# Patient Record
Sex: FEMALE | Race: WHITE | NOT HISPANIC OR LATINO | Employment: OTHER | URBAN - METROPOLITAN AREA
[De-identification: names, ages, dates, MRNs, and addresses within clinical notes are randomized per-mention and may not be internally consistent; named-entity substitution may affect disease eponyms.]

---

## 2023-02-10 ENCOUNTER — TELEPHONE (OUTPATIENT)
Dept: TRANSPLANT | Facility: CLINIC | Age: 68
End: 2023-02-10
Payer: COMMERCIAL

## 2023-02-10 ENCOUNTER — DOCUMENTATION ONLY (OUTPATIENT)
Dept: TRANSPLANT | Facility: CLINIC | Age: 68
End: 2023-02-10
Payer: COMMERCIAL

## 2023-02-10 NOTE — TELEPHONE ENCOUNTER
"Donor Intake Start:23Donor Intake Complete:23  Expiration Date:23  Gender:FemalePreferred Language:English  Full Name:Kim Barone  Needed:[not answered]  Phone Number:[phone] Phone:  Contact Preference:[not answered]Best Contact Time:9am - 5pm  Emergency Contact:Harish Escobar Contact #:[phone]  Relationship to Contact:Contact is my spouse  :[birth year]Age:67  Country:[place]  Address:[address] [place] [address] [place] [place]  [id number]  Height:5'7\"Weight:145lbs  BMI:22.7  Employment Status:RetiredHas PTO for donation?[not answered]  Occupation:[not answered]Requires Heavy Lifting?[not answered]  Education Level:High SchoolMarital Status:  Exercise Routine:OccasionalHealth Insurance:  Yes  Blood Type:AEthnicity/Race:White  Donor Type:Standard Voucher Donor  Prefer Remote Donation:[not answered]  Physician:Deja Flowers [place]  Motivation to donate:  I'm a friend of Maria Del Carmen's parents. Maria Del Carmen has had kidney problems since she was 13 and is now in stage 4 of kidney disease. I know they wouldn't be reaching out to friends unless the situation was getting critical.  Living Donor Pre-Screening  Is In [place]  Yes  Will Accept Blood Transfusions?  Yes  Has been Diagnosed with Kidney Disease?  No  Has had a Heart Attack?  No  Has Diabetes?  No  Has had Cancer?  Yes  Type Remission  Skin Current (less than 1yr)  Has had Kidney Stones?  No  Has ever been Pregnant?  Yes    - Is Currently Pregnant?  No    - Months Since Pregnancy?24+    - Is Currently Nursing?  No    - Gestational Diabetes?  No    - Hypertension during pregnancy?  Never  Is Planning on Pregnancy?  No  Is Taking Birth Control?  No  Has Used Tobacco  Yes    - Currently uses Tobacco?  No    - Will Stop for Surgery?N/A    - How Many Years:40    - Tobacco use (packs/cans) / Frequency:1 / Daily  Has HIV?  No  Is Currently Incarcerated?  No  Is Currently Residing in [place]  Yes  History " Misc  Has Allergies?  Yes  Allergy  Pollen (hayfever)  Has had Surgeries?  Yes  Surgery When  Tonsilectomy 1964  Takes Medication?  No  Medical History  History of High BP?  Never  Has History Of CABG (bypass surgery)?  No  History of Blood Clots?  Never  History of Coronary Disease?  Never  Has Stents Implanted?  No  Has History of Chest Pain with Exercise?  No  Has History of Chest Pain at Other Times?  No  Results of Climbing 2 Flights of Stairs?Shortness Of Breath  Has had Stress Test within Last Year?  No  Has had Stroke?  No  Has had Leg Bypass?  No  History of Lung Disease?  Never  History of COPD?  Never  History of TB?  Never    - Is TB Active?[not answered]  History of Pneumonia?  Never  Has Respiratory Issues?  No  Has Gastro Issues?  No  History of Gallstones?  Never  History of Pancreatitis?  Never  History of Liver Disease?  Never  History of Hepatitis B?  Never    - Is Hep B Active?[not answered]  History of Hepatitis C?  Never  History of Bleeding Problem?  Never  History of UTIs?  No  History of Kidney Damage?  Never  History of Proteinuria?  Never  History of Hematuria?  Never  History of Neuro Disease?  Never  History of Seizure?  Never  History of Lupus?  Never  History of Paralysis?  Never  History of Arthritis?  Never  History of Neuropathy?  Never  History of Depression?  Never  History of Anxiety?  Never  History of Documented Psychiatric Illness?  Never  History of Fibroid Uterus?  Never  History of Endometriosis?  Never  History of Polycystic Ovaries?  Never  Has had Miscarriages?  No  Has had Abortions?  No  Has had Transfusions?  No  History of Obesity?  No  History of Fabry's Disease?  No  History of Sickle Cell Disease?  No  History of Sickle Cell Trait?  No  History of Sarcoidosis?  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  Yes    - how many years ago:1  Has had Mammogram?  Yes    - how many years  ago:1  Has had Pap Smear?  Yes    - how many years ago:4  Has had Colonoscopy?  Yes    - How Many Years Ago:1  Medical History Comments?[no comments]  Living Donor Family Medical History  Anyone with kidney disease?  No  Anyone with liver disease?  No  Anyone with heart disease?  Yes    - which family members:Grandmother and father  Anyone with coronary artery disease?  Unknown  Anyone with high blood pressure?  Yes    - which family members:Father and sister  Anyone with blood disorder?  No  Anyone with cancer?  Yes    - which family members:Grandfathers and mother  Anyone with kidney cancer?  No  Anyone with diabetes?  No  Is mother alive?  No  Mother's age?84  Mother's cause of death?Esophogeal and colon cancer  Is father alive?  Yes  Father's age?87  How many siblings?1  How many adult children?2  How many children under 18?0  Social History  Has Used Alcohol?  Yes    - currently uses alcohol:  Yes    - how much:1/Daily  Has Abused Alcohol?  No  Has Used Drugs?  No  Has had legal issues w/ law enforcement?  No  Traveled over 100 miles from home in last year?  Yes    - Traveled Where?[place] and also road trip involving many states  Has had suicidal thoughts or attempts in the last five years?  No

## 2023-02-14 ENCOUNTER — TELEPHONE (OUTPATIENT)
Dept: TRANSPLANT | Facility: CLINIC | Age: 68
End: 2023-02-14
Payer: COMMERCIAL

## 2023-02-14 NOTE — TELEPHONE ENCOUNTER
Initial Independent Living Donor Advocate contact made with potential donor today.  I introduced myself and my role during the donation process, includin.  DAYNE ROLE   The federal government requires that all licensed transplant centers provide the living donor with an Independent Living Donor Advocate (DAYNE).  I do not meet recipients or attend meetings that discuss their care or decision to transplant them. My role is separate to avoid any conflict of interest.  My role is to ensure:  1) your rights are protected;  2) you get all the information you need from the transplant team to make a fully informed decision whether to donate;   3) that living donation is in your best interest.   4) that you have the right to decide NOT to go forward with living donation at any time during this process.  I am available to you throughout the workup, during surgery phase and follow-up at home.     2. WORKUP & PRIVACY     Your identity and workup are not shared with the recipient at any time.     The recipient's insurance covers the medical expenses related to the donor evaluation and surgery.  However, it is important that you carry your own health insurance to address any medical issues that are found and are NOT related to living donation.  Additionally, age appropriate cancer screening (I.e. mammograms,  colonoscopies, etc) is required and would be through your insurance.    There is a psychosocial and medical donor workup that consists of testing to determine if you are healthy enough to donate. Workup tests include tissue typing/genetics, many blood draws, urine collection/ (kidney function testing), chest x-ray, EKG/other heart testing, CT scan. Age appropriate cancer screening is required and would be through your insurance. As you complete each step then you may move on to the next.  Workup can take as little or as long as you need and you can stop the process at any time. Transplant is a treatment option, not a  cure. A kidney from a living kidney donor can last 12-14 years.  Other treatment options are  donation and two types of dialysis.     This is major surgery and your estimated hospital stay is approximately 1-2 nights.  After surgery, there are driving and lifting restrictions - no driving for two weeks and no lifting over ten pounds for 8 weeks.  Donors are routinely off from work for 4 - 6 weeks after surgery, and potentially longer if they have a physical job.       If you anticipate lost wages due to donation, donor wage reimbursement options may be available to you and will be reviewed with you during the evaluation process. Donor Shield and NLDAC explained.    We reviewed the importance of completing follow-up labs and surveys at six months, 1 year and 2 years after donation to monitor kidney health and the impact donation has had on their life post donation.       3.  QUESTIONS    Have you received the Welcome e-mail that includes copies of the informed consent, financial letter, information on donor shield and NLDAC from the transplant department? Yes. Questions? Yes.    Have you discussed with anyone your potential decision to donate?   Yes.    Is anyone pressuring or coercing you to donate? No.    Have you discussed any financial arrangements with recipient around donating a kidney? No.    Are you aware that you can confidentially opt out at any time, up to and including day of donation? Yes.    At this time, would you like to proceed with the medical evaluation to see if you can be a kidney donor?  Yes.    If yes, I will make an appointment for your donor coordinator to reach out to you with next steps.     Contact information for DAYNE's was provided Yes.    Serina Lim- [phone]  Priti Bernard- [phone]      Time frame for donation: as soon as poosible  Paired exchange was introduced  Yes.      MyChart was initiated  Yes.  CareEverywhere was initiated No.    DAYNE NOTES: Kim wants to help  her good friend's mother.  Her donor nurse coordinator is Esther Powell RN, and she is scheduled to speak to her on 2/20/23 1 p.m.      Duration of call 30 minutes

## 2023-02-20 ENCOUNTER — TELEPHONE (OUTPATIENT)
Dept: TRANSPLANT | Facility: CLINIC | Age: 68
End: 2023-02-20
Payer: COMMERCIAL

## 2023-02-20 NOTE — TELEPHONE ENCOUNTER
Contacted Kim Pritchett to introduce myself and my role, review of medical/surgical/family history and next steps.     Kim Pritchett  is aware She can stop donor evaluation at any time.    Have you ever been positive for COVID 19? Yes    Have you received the COVID vaccination series? Yes     Reviewed risk of COVID-19 to living donors.    Regular blood donor? No Last blood donation date N/A (Notified donor to avoid blood donation at this time to get accurate blood counts if going through evaluation)     Kim Pritchett is a 67 year old female  ABO A that would like to learn more about friends daughter.     Concerns from medical/surgical/family history: none    Current medications and NSAID use: none    Legal issues w/ law enforcement: no    Reviewed any history of travel in endemic areas: [place], no  Strongyloides- [place], [place] and [place].  Trypanosoma cruzi ([place])- [place]  West Nile Virus- [place], [place], [place], [place] and [place].     Per our Phase 1 algorithm, does meet criteria to do preliminary testing.     Reviewed evaluation testing: Iohexol, Lab work, CXR, EKG, Provider visits and functions, CT Angiogram.     Reviewed operations of selection committee and angio review meetings and the need for multidisciplinary input. Post-donation requirements include post-op appointment with your surgeon at 2 weeks after surgery, 6 week, 6 month, 1 year and 2 year lab tests.     Reviewed NKR listing and transfer of care to [facility] team if approved. Provided Kim with NKR website to review.     Briefly went over options if approved of NDD and voucher donation.     Kim would like to proceed with next steps: phase 1     Confirmed that Kim reviewed Informed consent document and all questions answered.  Reviewed that they will receive Docusign to obtain electronic signature for the following: Informed consent, SRTR data, JOSESITO for medical information, Auth for Electronic communication and will need  their signed consent back before proceeding with evaluation.      Encouraged sign up for MyChart and reviewed importance of watching teaching videos prior to evaluation.     Verified recipient status if not NDD.    Donor timeline: TBD     Will send orders to scheduling team to set up for phase 1 testing.

## 2023-02-21 DIAGNOSIS — Z00.5 TRANSPLANT DONOR EVALUATION: Primary | ICD-10-CM

## 2023-03-01 ENCOUNTER — DOCUMENTATION ONLY (OUTPATIENT)
Dept: HEALTH INFORMATION MANAGEMENT | Facility: CLINIC | Age: 68
End: 2023-03-01

## 2023-03-22 ENCOUNTER — TELEPHONE (OUTPATIENT)
Dept: TRANSPLANT | Facility: CLINIC | Age: 68
End: 2023-03-22
Payer: COMMERCIAL

## 2023-03-22 NOTE — TELEPHONE ENCOUNTER
Spoke to Kim regarding phase 1 results. They are all within normal limits and she is okay to proceed to eval.     Kim Pritchett  is aware She can stop donor evaluation at any time.     Have you ever been positive for COVID 19? Yes     Have you received the COVID vaccination series? Yes      Reviewed risk of COVID-19 to living donors.     Regular blood donor? No Last blood donation date N/A (Notified donor to avoid blood donation at this time to get accurate blood counts if going through evaluation)     Kim Pritchett is a 67 year old female ABO A that would like to learn more about friends daughter.     Concerns from medical/surgical/family history: none     Current medications and NSAID use: none     Legal issues w/ law enforcement: no     Reviewed any history of travel in endemic areas: [place], no  Strongyloides- [place], [place] and [place].  Trypanosoma cruzi ([place])- [place]  West Nile Virus- [place], [place], [place], [place] and [place].      Reviewed evaluation testing: Iohexol, Lab work, CXR, EKG, Provider visits and functions, CT Angiogram.     Reviewed operations of selection committee and angio review meetings and the need for multidisciplinary input. Post-donation requirements include post-op appointment with your surgeon at 2 weeks after surgery, 6 week, 6 month, 1 year and 2 year lab tests.     Reviewed NKR listing and transfer of care to [facility] team if approved. Provided Kim with NKR website to review.     Briefly went over options if approved of NDD and voucher donation.     Kim would like to proceed with next steps: eval     Confirmed that Kim reviewed Informed consent document and all questions answered.  Reviewed that they will receive Docusign to obtain electronic signature for the following: Informed consent, SRTR data, JOSESITO for medical information, Auth for Electronic communication and will need their signed consent back before proceeding with evaluation.       Encouraged sign up for MyChart and reviewed importance of watching teaching videos prior to evaluation.     Verified recipient status if not NDD.     Donor timeline: TBD     Will send orders to scheduling team to set up for eval testing.

## 2023-03-23 DIAGNOSIS — Z00.5 TRANSPLANT DONOR EVALUATION: Primary | ICD-10-CM

## 2023-03-23 RX ORDER — ALBUTEROL SULFATE 90 UG/1
1-2 AEROSOL, METERED RESPIRATORY (INHALATION)
Status: CANCELLED
Start: 2023-04-20

## 2023-03-23 RX ORDER — MEPERIDINE HYDROCHLORIDE 25 MG/ML
25 INJECTION INTRAMUSCULAR; INTRAVENOUS; SUBCUTANEOUS EVERY 30 MIN PRN
Status: CANCELLED | OUTPATIENT
Start: 2023-04-20

## 2023-03-23 RX ORDER — DIPHENHYDRAMINE HYDROCHLORIDE 50 MG/ML
50 INJECTION INTRAMUSCULAR; INTRAVENOUS
Status: CANCELLED
Start: 2023-04-20

## 2023-03-23 RX ORDER — ALBUTEROL SULFATE 0.83 MG/ML
2.5 SOLUTION RESPIRATORY (INHALATION)
Status: CANCELLED | OUTPATIENT
Start: 2023-04-20

## 2023-03-23 RX ORDER — METHYLPREDNISOLONE SODIUM SUCCINATE 125 MG/2ML
125 INJECTION, POWDER, LYOPHILIZED, FOR SOLUTION INTRAMUSCULAR; INTRAVENOUS
Status: CANCELLED
Start: 2023-04-20

## 2023-03-23 RX ORDER — EPINEPHRINE 1 MG/ML
0.3 INJECTION, SOLUTION, CONCENTRATE INTRAVENOUS EVERY 5 MIN PRN
Status: CANCELLED | OUTPATIENT
Start: 2023-04-20

## 2023-03-27 ENCOUNTER — DOCUMENTATION ONLY (OUTPATIENT)
Dept: TRANSPLANT | Facility: CLINIC | Age: 68
End: 2023-03-27
Payer: COMMERCIAL

## 2023-03-27 NOTE — PROGRESS NOTES
"Received vitals from [facility] in [place], [place] phone #[phone]:BP's:126/80,111/72,110/67 Ht 5' 6\"153#'s  "

## 2023-04-01 ENCOUNTER — HEALTH MAINTENANCE LETTER (OUTPATIENT)
Age: 68
End: 2023-04-01

## 2023-04-10 ENCOUNTER — TELEPHONE (OUTPATIENT)
Dept: TRANSPLANT | Facility: CLINIC | Age: 68
End: 2023-04-10
Payer: COMMERCIAL

## 2023-04-10 ENCOUNTER — DOCUMENTATION ONLY (OUTPATIENT)
Dept: TRANSPLANT | Facility: CLINIC | Age: 68
End: 2023-04-10
Payer: COMMERCIAL

## 2023-04-10 NOTE — PROGRESS NOTES
Spoke to team and they are okay with bring Kim forward once she has completed her meds and follow up with her PCP regarding the superficial thrombophlebitis. This should not rule her out so she can be seen after completion of xarelto and resolution of symptoms.  Kim is aware of the plan and will call me once she is off her medication and done her follow up visits with her MD.

## 2023-04-10 NOTE — TELEPHONE ENCOUNTER
Spoke to Kim regarding her chart message she sent me.    I cancelled her eval for next week but she doesn't know why this blood clot happened. She has varicose veins and lack of exercise (which she has been changing).  I told her she needs to complete the 45 days of Xarelto before coming in but needed to check with the team to make sure she is able to continue as a potential donor. She wasn't on any medication previously either.    Will reach out to the team and get back to Kim.

## 2023-05-03 DIAGNOSIS — Z00.5 TRANSPLANT DONOR EVALUATION: Primary | ICD-10-CM

## 2023-05-03 RX ORDER — ALBUTEROL SULFATE 0.83 MG/ML
2.5 SOLUTION RESPIRATORY (INHALATION)
Status: CANCELLED | OUTPATIENT
Start: 2023-06-15

## 2023-05-03 RX ORDER — METHYLPREDNISOLONE SODIUM SUCCINATE 125 MG/2ML
125 INJECTION, POWDER, LYOPHILIZED, FOR SOLUTION INTRAMUSCULAR; INTRAVENOUS
Status: CANCELLED
Start: 2023-06-15

## 2023-05-03 RX ORDER — MEPERIDINE HYDROCHLORIDE 25 MG/ML
25 INJECTION INTRAMUSCULAR; INTRAVENOUS; SUBCUTANEOUS EVERY 30 MIN PRN
Status: CANCELLED | OUTPATIENT
Start: 2023-06-15

## 2023-05-03 RX ORDER — ALBUTEROL SULFATE 90 UG/1
1-2 AEROSOL, METERED RESPIRATORY (INHALATION)
Status: CANCELLED
Start: 2023-06-15

## 2023-05-03 RX ORDER — EPINEPHRINE 1 MG/ML
0.3 INJECTION, SOLUTION, CONCENTRATE INTRAVENOUS EVERY 5 MIN PRN
Status: CANCELLED | OUTPATIENT
Start: 2023-06-15

## 2023-05-03 RX ORDER — DIPHENHYDRAMINE HYDROCHLORIDE 50 MG/ML
50 INJECTION INTRAMUSCULAR; INTRAVENOUS
Status: CANCELLED
Start: 2023-06-15

## 2023-06-14 LAB
A1 AG RBC QL: POSITIVE
ABO/RH(D): NORMAL
ANTIBODY SCREEN: NEGATIVE
SPECIMEN EXPIRATION DATE: NORMAL
SPECIMEN EXPIRATION DATE: NORMAL

## 2023-06-15 ENCOUNTER — ALLIED HEALTH/NURSE VISIT (OUTPATIENT)
Dept: TRANSPLANT | Facility: CLINIC | Age: 68
End: 2023-06-15
Attending: INTERNAL MEDICINE

## 2023-06-15 ENCOUNTER — APPOINTMENT (OUTPATIENT)
Dept: TRANSPLANT | Facility: CLINIC | Age: 68
End: 2023-06-15
Attending: INTERNAL MEDICINE

## 2023-06-15 ENCOUNTER — OFFICE VISIT (OUTPATIENT)
Dept: INFUSION THERAPY | Facility: CLINIC | Age: 68
End: 2023-06-15
Attending: INTERNAL MEDICINE

## 2023-06-15 ENCOUNTER — OFFICE VISIT (OUTPATIENT)
Dept: TRANSPLANT | Facility: CLINIC | Age: 68
End: 2023-06-15
Attending: INTERNAL MEDICINE

## 2023-06-15 ENCOUNTER — OFFICE VISIT (OUTPATIENT)
Dept: NEPHROLOGY | Facility: CLINIC | Age: 68
End: 2023-06-15
Attending: INTERNAL MEDICINE

## 2023-06-15 ENCOUNTER — LAB (OUTPATIENT)
Dept: LAB | Facility: CLINIC | Age: 68
End: 2023-06-15
Attending: INTERNAL MEDICINE

## 2023-06-15 ENCOUNTER — LAB (OUTPATIENT)
Dept: LAB | Facility: CLINIC | Age: 68
End: 2023-06-15
Attending: INTERNAL MEDICINE
Payer: COMMERCIAL

## 2023-06-15 ENCOUNTER — ANCILLARY PROCEDURE (OUTPATIENT)
Dept: GENERAL RADIOLOGY | Facility: CLINIC | Age: 68
End: 2023-06-15
Attending: INTERNAL MEDICINE
Payer: COMMERCIAL

## 2023-06-15 ENCOUNTER — ANCILLARY PROCEDURE (OUTPATIENT)
Dept: CT IMAGING | Facility: CLINIC | Age: 68
End: 2023-06-15
Attending: INTERNAL MEDICINE
Payer: COMMERCIAL

## 2023-06-15 ENCOUNTER — HOSPITAL ENCOUNTER (OUTPATIENT)
Dept: CARDIOLOGY | Facility: CLINIC | Age: 68
Discharge: HOME | End: 2023-06-15
Attending: INTERNAL MEDICINE

## 2023-06-15 VITALS
OXYGEN SATURATION: 98 % | TEMPERATURE: 97.4 F | DIASTOLIC BLOOD PRESSURE: 86 MMHG | HEART RATE: 64 BPM | WEIGHT: 155.9 LBS | SYSTOLIC BLOOD PRESSURE: 142 MMHG | RESPIRATION RATE: 16 BRPM

## 2023-06-15 VITALS
DIASTOLIC BLOOD PRESSURE: 73 MMHG | SYSTOLIC BLOOD PRESSURE: 111 MMHG | OXYGEN SATURATION: 97 % | WEIGHT: 154.9 LBS | BODY MASS INDEX: 24.31 KG/M2 | HEIGHT: 67 IN | HEART RATE: 69 BPM

## 2023-06-15 DIAGNOSIS — Z00.5 TRANSPLANT DONOR EVALUATION: ICD-10-CM

## 2023-06-15 DIAGNOSIS — Z00.5 TRANSPLANT DONOR EVALUATION: Primary | ICD-10-CM

## 2023-06-15 LAB
ABO/RH(D): NORMAL
ALBUMIN MFR UR ELPH: <6 MG/DL
ALBUMIN SERPL BCG-MCNC: 4.2 G/DL (ref 3.5–5.2)
ALBUMIN UR-MCNC: NEGATIVE MG/DL
ALP SERPL-CCNC: 123 U/L (ref 35–104)
ALT SERPL W P-5'-P-CCNC: 12 U/L (ref 0–50)
ANION GAP SERPL CALCULATED.3IONS-SCNC: 9 MMOL/L (ref 7–15)
APPEARANCE UR: CLEAR
APTT PPP: 28 SECONDS (ref 22–38)
AST SERPL W P-5'-P-CCNC: 16 U/L (ref 0–45)
BACTERIA #/AREA URNS HPF: ABNORMAL /HPF
BILIRUB SERPL-MCNC: 0.3 MG/DL
BILIRUB UR QL STRIP: NEGATIVE
BUN SERPL-MCNC: 15.5 MG/DL (ref 8–23)
CALCIUM SERPL-MCNC: 9.6 MG/DL (ref 8.8–10.2)
CHLORIDE SERPL-SCNC: 105 MMOL/L (ref 98–107)
CHOLEST SERPL-MCNC: 180 MG/DL
CMV IGG SERPL IA-ACNC: <0.2 U/ML
CMV IGG SERPL IA-ACNC: NORMAL
COLOR UR AUTO: ABNORMAL
CREAT SERPL-MCNC: 0.9 MG/DL (ref 0.51–0.95)
CREAT UR-MCNC: 32.7 MG/DL
CREAT UR-MCNC: 32.9 MG/DL
DEPRECATED HCO3 PLAS-SCNC: 26 MMOL/L (ref 22–29)
EBV VCA IGG SER IA-ACNC: >750 U/ML
EBV VCA IGG SER IA-ACNC: POSITIVE
ERYTHROCYTE [DISTWIDTH] IN BLOOD BY AUTOMATED COUNT: 13.6 % (ref 10–15)
GFR SERPL CREATININE-BSD FRML MDRD: 70 ML/MIN/1.73M2
GLUCOSE SERPL-MCNC: 101 MG/DL (ref 70–99)
GLUCOSE UR STRIP-MCNC: NEGATIVE MG/DL
HBA1C MFR BLD: 5.6 %
HBV CORE AB SERPL QL IA: NONREACTIVE
HBV SURFACE AB SERPL IA-ACNC: 0.58 M[IU]/ML
HBV SURFACE AB SERPL IA-ACNC: NONREACTIVE M[IU]/ML
HBV SURFACE AG SERPL QL IA: NONREACTIVE
HCT VFR BLD AUTO: 41.7 % (ref 35–47)
HCV AB SERPL QL IA: NONREACTIVE
HDLC SERPL-MCNC: 86 MG/DL
HGB BLD-MCNC: 13.6 G/DL (ref 11.7–15.7)
HGB UR QL STRIP: NEGATIVE
HIV 1+2 AB+HIV1 P24 AG SERPL QL IA: NONREACTIVE
INR PPP: 0.95 (ref 0.85–1.15)
KETONES UR STRIP-MCNC: NEGATIVE MG/DL
LDLC SERPL CALC-MCNC: 76 MG/DL
LEUKOCYTE ESTERASE UR QL STRIP: ABNORMAL
MCH RBC QN AUTO: 29.8 PG (ref 26.5–33)
MCHC RBC AUTO-ENTMCNC: 32.6 G/DL (ref 31.5–36.5)
MCV RBC AUTO: 91 FL (ref 78–100)
MICROALBUMIN UR-MCNC: <12 MG/L
MICROALBUMIN/CREAT UR: NORMAL MG/G{CREAT}
NITRATE UR QL: NEGATIVE
NONHDLC SERPL-MCNC: 94 MG/DL
PH UR STRIP: 5.5 [PH] (ref 5–7)
PHOSPHATE SERPL-MCNC: 3.1 MG/DL (ref 2.5–4.5)
PLATELET # BLD AUTO: 379 10E3/UL (ref 150–450)
POTASSIUM SERPL-SCNC: 4 MMOL/L (ref 3.4–5.3)
PROT SERPL-MCNC: 6.9 G/DL (ref 6.4–8.3)
PROT/CREAT 24H UR: NORMAL MG/G{CREAT}
RADIOLOGIST FLAGS: NORMAL
RBC # BLD AUTO: 4.57 10E6/UL (ref 3.8–5.2)
RBC URINE: 1 /HPF
SODIUM SERPL-SCNC: 140 MMOL/L (ref 136–145)
SP GR UR STRIP: 1 (ref 1–1.03)
SPECIMEN EXPIRATION DATE: NORMAL
SQUAMOUS EPITHELIAL: 1 /HPF
T PALLIDUM AB SER QL: NONREACTIVE
TRIGL SERPL-MCNC: 89 MG/DL
URATE SERPL-MCNC: 4.8 MG/DL (ref 2.4–5.7)
UROBILINOGEN UR STRIP-MCNC: NORMAL MG/DL
WBC # BLD AUTO: 6.6 10E3/UL (ref 4–11)
WBC URINE: 14 /HPF

## 2023-06-15 PROCEDURE — G0463 HOSPITAL OUTPT CLINIC VISIT: HCPCS | Mod: 25 | Performed by: TRANSPLANT SURGERY

## 2023-06-15 PROCEDURE — 86803 HEPATITIS C AB TEST: CPT

## 2023-06-15 PROCEDURE — 82542 COL CHROMOTOGRAPHY QUAL/QUAN: CPT | Performed by: TRANSPLANT SURGERY

## 2023-06-15 PROCEDURE — 84550 ASSAY OF BLOOD/URIC ACID: CPT

## 2023-06-15 PROCEDURE — 93321 DOPPLER ECHO F-UP/LMTD STD: CPT | Mod: 26 | Performed by: INTERNAL MEDICINE

## 2023-06-15 PROCEDURE — 86644 CMV ANTIBODY: CPT

## 2023-06-15 PROCEDURE — 255N000002 HC RX 255 OP 636: Performed by: INTERNAL MEDICINE

## 2023-06-15 PROCEDURE — 74175 CTA ABDOMEN W/CONTRAST: CPT | Performed by: RADIOLOGY

## 2023-06-15 PROCEDURE — 85610 PROTHROMBIN TIME: CPT

## 2023-06-15 PROCEDURE — 84100 ASSAY OF PHOSPHORUS: CPT

## 2023-06-15 PROCEDURE — 81001 URINALYSIS AUTO W/SCOPE: CPT

## 2023-06-15 PROCEDURE — 71046 X-RAY EXAM CHEST 2 VIEWS: CPT | Performed by: RADIOLOGY

## 2023-06-15 PROCEDURE — 87340 HEPATITIS B SURFACE AG IA: CPT

## 2023-06-15 PROCEDURE — 86901 BLOOD TYPING SEROLOGIC RH(D): CPT

## 2023-06-15 PROCEDURE — 86481 TB AG RESPONSE T-CELL SUSP: CPT

## 2023-06-15 PROCEDURE — 99207 PR NO CHARGE COORDINATED CARE PS: CPT

## 2023-06-15 PROCEDURE — 99207 PR NO CHARGE COORDINATED CARE PS: CPT | Performed by: SOCIAL WORKER

## 2023-06-15 PROCEDURE — 85027 COMPLETE CBC AUTOMATED: CPT

## 2023-06-15 PROCEDURE — 93350 STRESS TTE ONLY: CPT | Mod: 26 | Performed by: INTERNAL MEDICINE

## 2023-06-15 PROCEDURE — 36415 COLL VENOUS BLD VENIPUNCTURE: CPT

## 2023-06-15 PROCEDURE — 99205 OFFICE O/P NEW HI 60 MIN: CPT | Mod: GC | Performed by: INTERNAL MEDICINE

## 2023-06-15 PROCEDURE — 80053 COMPREHEN METABOLIC PANEL: CPT

## 2023-06-15 PROCEDURE — 86665 EPSTEIN-BARR CAPSID VCA: CPT

## 2023-06-15 PROCEDURE — C8928 TTE W OR W/O FOL W/CON,STRES: HCPCS

## 2023-06-15 PROCEDURE — 86704 HEP B CORE ANTIBODY TOTAL: CPT

## 2023-06-15 PROCEDURE — 80061 LIPID PANEL: CPT

## 2023-06-15 PROCEDURE — 86780 TREPONEMA PALLIDUM: CPT

## 2023-06-15 PROCEDURE — 83036 HEMOGLOBIN GLYCOSYLATED A1C: CPT

## 2023-06-15 PROCEDURE — 93016 CV STRESS TEST SUPVJ ONLY: CPT | Performed by: INTERNAL MEDICINE

## 2023-06-15 PROCEDURE — 86706 HEP B SURFACE ANTIBODY: CPT

## 2023-06-15 PROCEDURE — 93325 DOPPLER ECHO COLOR FLOW MAPG: CPT | Mod: 26 | Performed by: INTERNAL MEDICINE

## 2023-06-15 PROCEDURE — 81378 HLA I & II TYPING HR: CPT

## 2023-06-15 PROCEDURE — 85730 THROMBOPLASTIN TIME PARTIAL: CPT

## 2023-06-15 PROCEDURE — 84156 ASSAY OF PROTEIN URINE: CPT

## 2023-06-15 PROCEDURE — 86905 BLOOD TYPING RBC ANTIGENS: CPT

## 2023-06-15 PROCEDURE — 36415 COLL VENOUS BLD VENIPUNCTURE: CPT | Performed by: TRANSPLANT SURGERY

## 2023-06-15 PROCEDURE — 86850 RBC ANTIBODY SCREEN: CPT

## 2023-06-15 PROCEDURE — 99203 OFFICE O/P NEW LOW 30 MIN: CPT | Performed by: TRANSPLANT SURGERY

## 2023-06-15 PROCEDURE — 93018 CV STRESS TEST I&R ONLY: CPT | Performed by: INTERNAL MEDICINE

## 2023-06-15 PROCEDURE — 82570 ASSAY OF URINE CREATININE: CPT

## 2023-06-15 PROCEDURE — 86789 WEST NILE VIRUS ANTIBODY: CPT

## 2023-06-15 RX ORDER — DIPHENHYDRAMINE HYDROCHLORIDE 50 MG/ML
50 INJECTION INTRAMUSCULAR; INTRAVENOUS
Status: CANCELLED
Start: 2023-06-15

## 2023-06-15 RX ORDER — EPINEPHRINE 1 MG/ML
0.3 INJECTION, SOLUTION INTRAMUSCULAR; SUBCUTANEOUS EVERY 5 MIN PRN
Status: CANCELLED | OUTPATIENT
Start: 2023-06-15

## 2023-06-15 RX ORDER — METHYLPREDNISOLONE SODIUM SUCCINATE 125 MG/2ML
125 INJECTION, POWDER, LYOPHILIZED, FOR SOLUTION INTRAMUSCULAR; INTRAVENOUS
Status: CANCELLED
Start: 2023-06-15

## 2023-06-15 RX ORDER — ALBUTEROL SULFATE 0.83 MG/ML
2.5 SOLUTION RESPIRATORY (INHALATION)
Status: CANCELLED | OUTPATIENT
Start: 2023-06-15

## 2023-06-15 RX ORDER — ALBUTEROL SULFATE 90 UG/1
1-2 AEROSOL, METERED RESPIRATORY (INHALATION)
Status: CANCELLED
Start: 2023-06-15

## 2023-06-15 RX ORDER — IOPAMIDOL 755 MG/ML
100 INJECTION, SOLUTION INTRAVASCULAR ONCE
Status: COMPLETED | OUTPATIENT
Start: 2023-06-15 | End: 2023-06-15

## 2023-06-15 RX ORDER — MEPERIDINE HYDROCHLORIDE 25 MG/ML
25 INJECTION INTRAMUSCULAR; INTRAVENOUS; SUBCUTANEOUS EVERY 30 MIN PRN
Status: CANCELLED | OUTPATIENT
Start: 2023-06-15

## 2023-06-15 RX ADMIN — PERFLUTREN 5 ML: 6.52 INJECTION, SUSPENSION INTRAVENOUS at 15:11

## 2023-06-15 RX ADMIN — IOPAMIDOL 100 ML: 755 INJECTION, SOLUTION INTRAVASCULAR at 13:15

## 2023-06-15 RX ADMIN — IOHEXOL 4 ML: 350 INJECTION, SOLUTION INTRAVENOUS at 07:52

## 2023-06-15 ASSESSMENT — PAIN SCALES - GENERAL: PAINLEVEL: NO PAIN (0)

## 2023-06-15 NOTE — PROGRESS NOTES
"Chief Complaint   Patient presents with     Blood Draw     Labs drawn via piv by rn in lab. VS taken.     OTHER     Iohexol test     Iohexol Timed Test Nursing Note    Patient comes to [facility] today for a Iohexol GFR Timed test.   Orders from Dr. Khan were completed.    Progress Note  Height: 5'7\"  Weight: 155 lb  Age: 67  Gender: Female    The following information was verified with the patient:  *Female patients are not pregnant or could not have become recently pregnant: YES  *Is there a history of allergy (skin rash, swelling, ect) to :   a. Iodine (except skin reactions to Betadine): YES   b. Intravenous radio-contrast agents: YES   c. Seafood: YES     RN provided patient with educational handout regarding timed test. YES    Medication administered :   Iohexol (Omnipaque 350 mg Iodine/ ml concentration) 4 mls.  Site administered Right AC  Start time 0753  Stop time 0755    Blood draws were taken from Left AC.  Baseline (Time 0) 0755: the time immediately after the injection is completed.  (2 Hour) 0955  (4 hour) 1155    Patient tolerated the procedure well    Discharge Plan  Discharge instructions reviewed with patient: YES  Discharge papers printed and given to patient: YES  Patient/Representative verbalized understanding, all questions answered: YES    Discharged from unit at 0800 with whom: self to Transplant Clinic.    Administrations This Visit     iohexol (OMNIPAQUE) 350 MG/ML injectable solution 4 mL     Admin Date  06/15/2023 Action  $Given Dose  4 mL Route  Intravenous Administered By  Itzel Bonilla, GAURAV          sodium chloride (PF) 0.9% PF flush 5 mL     Admin Date  06/15/2023 Action  $Given Dose  5 mL Route  Intracatheter Administered By  Itzel Bonilla RN                Vital signs:  Temp: 97.4  F (36.3  C)   BP: (!) 142/86 Pulse: 64   Resp: 16 SpO2: 98 %       Weight: 70.7 kg (155 lb 14.4 oz)  There is no height or weight on file to calculate BMI.        "

## 2023-06-15 NOTE — PROGRESS NOTES
[facility] Solid Organ Transplant  Outpatient MNT: Kidney Donor Evaluation    Current BMI: 24.6 (HT 66.5 in,  lbs/70 kg)    8 Year Estimated Risk of T2DM  </= 3%     Time Spent: 15 minutes  Visit Type: Initial  Referring Physician: Venkat  Pt accompanied by: self     Nutrition Assessment  Vitamins, Supplements, Pertinent Meds: none   Herbal Medicines/Supplements: none    Weight hx: stable     Diet Recall  Breakfast Late AM- acosta, egg, toast or waits until lunch - leftovers    Lunch Combines w/ BF   Dinner Sloppy joes & mac and cheese; chicken breast w/ broccoli & roasted potatoes    Snacks None    Beverages Coffee (black), water, 8 oz OJ   Alcohol 1-2 glasses wine/day since shelter    Dining out 1x/week (summer) or less often in the winter      Physical Activity  Walking & elliptical- during the winter is more consistent  [place]      Labs  Recent Labs   Lab Test 06/15/23  0736   CHOL 180   HDL 86   LDL 76   TRIG 89     FBG = 101; 88 (3/2023)  A1c = 5.6  BP = wnl     Prediction of Incident Diabetes Mellitus in Middle-aged Adults: The [place] Offspring Study  Edward Robb MD; James B. Meigs, MD, MPH; Karen Betts, PhD; Salud Menendez MD, MPH; Nitin Kim MD; Kin Chawla Sr,   PhD  Pt's estimated risk for T2DM (per Table 6 above)  Pt received points for the following criteria: FBG>100  Total points: 10  8-Year estimated risk of T2DM: </= 3%    Nutrition Diagnosis  No nutrition diagnosis identified at this time.    Nutrition Intervention  Nutrition education provided:  Reviewed overall healthy diet guidelines for pre and post kidney donation. Discussed maintenance of a healthy weight and Na+ intake <3000 mg/day (<2000 mg/day if HTN).    Avoid the following post op d/t unknown effects on the organs:  - Herbal, Chinese, holistic, chiropractic, natural, alternative medicines and supplements  - Detoxes and cleanses  - Weight loss pills  - Protein powders  or other products with extracts or herbs (ie green tea extract)    Patient Understanding: Pt verbalized understanding of education provided.  Expected Engagement: Good  Follow-Up Plans: PRN     Nutrition Goals  No nutrition goals identified at this time     Maria Del Carmen Patton RD, LD, CCTD

## 2023-06-15 NOTE — LETTER
"    6/15/2023         RE: Kim Pritchett  [address]  [facility] [address]        Dear Colleague,    Thank you for referring your patient, Kim Pritchett, to the [facility]. Please see a copy of my visit note below.    Chief Complaint   Patient presents with     Blood Draw     Labs drawn via piv by rn in lab. VS taken.     OTHER     Iohexol test     Iohexol Timed Test Nursing Note    Patient comes to [facility] today for a Iohexol GFR Timed test.   Orders from Dr. Khan were completed.    Progress Note  Height: 5'7\"  Weight: 155 lb  Age: 67  Gender: Female    The following information was verified with the patient:  *Female patients are not pregnant or could not have become recently pregnant: YES  *Is there a history of allergy (skin rash, swelling, ect) to :   a. Iodine (except skin reactions to Betadine): YES   b. Intravenous radio-contrast agents: YES   c. Seafood: YES     RN provided patient with educational handout regarding timed test. YES    Medication administered :   Iohexol (Omnipaque 350 mg Iodine/ ml concentration) 4 mls.  Site administered Right AC  Start time 0753  Stop time 0755    Blood draws were taken from Left AC.  Baseline (Time 0) 0755: the time immediately after the injection is completed.  (2 Hour) 0955  (4 hour) 1155    Patient tolerated the procedure well    Discharge Plan  Discharge instructions reviewed with patient: YES  Discharge papers printed and given to patient: YES  Patient/Representative verbalized understanding, all questions answered: YES    Discharged from unit at 0800 with whom: self to Transplant Clinic.    Administrations This Visit     iohexol (OMNIPAQUE) 350 MG/ML injectable solution 4 mL     Admin Date  06/15/2023 Action  $Given Dose  4 mL Route  Intravenous Administered By  Itzel Bonilla, RN          sodium chloride (PF) 0.9% PF flush 5 mL     Admin Date  06/15/2023 Action  $Given Dose  5 mL Route  Intracatheter Administered " By  Itzel Bonilla RN                Vital signs:  Temp: 97.4  F (36.3  C)   BP: (!) 142/86 Pulse: 64   Resp: 16 SpO2: 98 %       Weight: 70.7 kg (155 lb 14.4 oz)  There is no height or weight on file to calculate BMI.            Again, thank you for allowing me to participate in the care of your patient.        Sincerely,        Specialty Infusion Nurse

## 2023-06-15 NOTE — LETTER
6/15/2023       RE: Kim Pritchett  [address]  [place] [address]     Dear Colleague,    Thank you for referring your patient, Kim Pritchett, to the [facility] [place] [facility]. Please see a copy of my visit note below.    TRANSPLANT NEPHROLOGY DONOR EVALUATION    Assessment and Plan:  # Prospective Kidney Transplant Donor: Patient with one issue that needs to be addressed prior to donation. Patient's blood pressure is acceptable at this visit, kidney function appears to be possibly low with Iohexol pending, and urinalysis is abnormal.    # Superficial thrombophlebitis of greater saphenous vein: completed 45 days treatment with Xarelto    # Chronic NSAID use: used Advil PM daily for close to 5 years in past. Has discontinued it for sometime now.    # Asymptomatic pyuria: Recommend repeat urinalysis    # Low creatinine based GFR: follow iohexol based GFR    Discussed the risks of donating a kidney, including the surgical risk and the possible risks of living with one kidney.    Education about expected post-donation kidney function and how chronic kidney disease (CKD) and end stage kidney disease (ESKD) might potentially impact the donor in the future, include, but not limited to:       - On average, donors will have 25-35% permanent loss of kidney function at donation.       - Baseline risk of ESKD may slightly exceed that of members of the general population with the same demographic profile.       - Donor risks must be interpreted in light of known epidemiology of both CKD or ESKD, such as that CKD generally develops in midlife (40-50 years old) and ESKD generally develops after age 60.       - Medical evaluation of young potential donors cannot predict lifetime risk of CKD or ESKD.       - Donors may be at higher risk for CKD if they sustain damage to the remaining kidney.       - Development of CKD and progression of ESKD may be  more rapid with only 1 kidney.       - Some type of kidney replacement therapy, either kidney transplant or dialysis, is required when reaching ESKD.    Potential medical or surgical risks include, but not limited to:       - Death.       - Scars, pain, fatigue, and other consequences typical of any surgical procedure.       - Decreased kidney function.       - Abdominal or bowel symptoms, such as bloating and nausea, and developing bowel obstruction.       - Kidney failure (ESKD) and the need for a kidney transplant or dialysis for the donor.       - Impact of obesity, hypertension, or other donor-specific medical conditions on morbidity and mortality of the potential donor.    Patients overall evaluation will be discussed with the transplant team and a final recommendation on the patients' suitability to be a kidney transplant donor will be made at that time.    Consult:  Kim Pritchett was seen in consultation at the request of Dr. Chase Robledo for evaluation as a potential kidney transplant donor.    Reason for Visit:  Kim Pritchett is a 67 year old female who presents for a kidney donor evaluation.  Patient would like to donate to her daughter's friend.    Patient seen and discussed with Dr Erin Andarde M.D  Nephrology Fellow  [phone]    Attestation:  This patient has been seen and evaluated by me, Gavino Khan MD.  I have reviewed the note and agree with plan of care as documented by the fellow.       Present Condition and Donor-Related Medical History:    Energy level is good and has been normal.  She is active and does get some exercise.  Denies any chest pain or shortness of breath with exertion.  Appetite is good and no recent weight change.  No nausea, vomiting or diarrhea.  No fever, sweats or chills.  No leg swelling.  No pain or burning with urination.         Kidney Disease Hx:       h/o Kidney Problems: No  Family h/o Genetic Kidney Disease: No       h/o Hypertension: No    Usual  Blood Pressure: Not checked       h/o Protein in Urine: No  h/o Blood in Urine: No       h/o Kidney Stones: No  h/o Kidney Injury: No       h/o Recurrent UTI: No  h/o Genitourinary Problems: No       h/o Chronic NSAID Use: Yes, daily          Other Medical Hx:       h/o Diabetes: No             h/o Gastrointestinal, Pancreas or Liver Problems: No       h/o Lung or Heart Problems: No       h/o Hematologic Problems: No  h/o Bleeding or Clotting Problems: Yes: completed treatment for superficial thrombophlebitis       h/o Cancer: No       h/o Infection Problems: No       h/o Gestational DM: No      h/o Preeclampsia: No         Skin Cancer Risk:       h/o more than 50 moles: No       h/o extensive sun exposure: No       h/o melanoma: No       Family h/o melanoma: No         Mental Health Assessment:       h/o Depression: No       h/o Psychiatric Illness: No       h/o Suicidal Attempt(s): No    COVID Status:  Vaccination Up To Date: Yes  H/o COVID Infection: Yes , no lingering symptoms    Review Of Systems:   A comprehensive review of systems was obtained and negative, except as noted in the HPI or PMH.    Past Medical History:   History was taken from the patient as noted below.  Past Medical History:   Diagnosis Date     Allergic rhinitis due to pollen      Basal cell carcinoma 2022 2023     Iritis     HLA-B27 gene     Varicose veins of both lower extremities        Past Social History:   Past Surgical History:   Procedure Laterality Date     TONSILLECTOMY  1964     Personal or family history of anesthesia problems: No    Family History:   Family History   Problem Relation Age of Onset     Esophageal Cancer Mother      Colon Cancer Mother      Skin Cancer Mother      Hypertension Father      Heart Disease Father      Hypertension Sister      Heart Disease Paternal Grandmother      Obesity Paternal Grandmother      No Known Problems Daughter      No Known Problems Son           Specific Family History in First Degree  Relatives:       FH of Kidney Dz: No FH of Diabetes: No       FH of Hypertension: No  FH of CAD: Yes        FH of Cancer: No  FH of Kidney Cancer: No    Personal History:   Social History     Socioeconomic History     Marital status:      Spouse name: Not on file     Number of children: Not on file     Years of education: Not on file     Highest education level: Not on file   Occupational History     Not on file   Tobacco Use     Smoking status: Former     Years: 35.00     Types: Cigarettes     Quit date:      Years since quittin.4     Smokeless tobacco: Never   Vaping Use     Vaping status: Every Day     Substances: Nicotine   Substance and Sexual Activity     Alcohol use: Yes     Comment: 1 drink daily     Drug use: Never     Sexual activity: Not on file   Other Topics Concern     Not on file   Social History Narrative     Not on file     Social Determinants of Health     Financial Resource Strain: Not on file   Food Insecurity: Not on file   Transportation Needs: Not on file   Physical Activity: Not on file   Stress: Not on file   Social Connections: Not on file   Intimate Partner Violence: Not on file   Housing Stability: Not on file          Specific Social History:       Health Insurance Status: Yes       Employment Status: Retired  Occupation:                        Living Arrangements: lives with their spouse       Social Support: Yes       Presence of increased risk for disease transmission behaviors as defined by PHS guidelines: No        Allergies:  No Known Allergies    Medications:  Current Outpatient Medications   Medication Sig     melatonin 5 MG tablet Take 5 mg by mouth nightly as needed for sleep     No current facility-administered medications for this visit.     There are no discontinued medications.      Vitals:      6/15/2023     8:41 AM 6/15/2023     8:48 AM 6/15/2023     8:49 AM   Vital Signs   Systolic 123 114 111   Diastolic 82 74 73   Pulse 69    "  Weight (LB) 154 lb 14.4 oz     Height 5' 6.5\"     BMI (Calculated) 24.63     O2 97 %         Exam:   GENERAL APPEARANCE: alert and no distress  HENT: mouth without ulcers or lesions  NECK: supple, no adenopathy  LYMPHATICS: no cervical or supraclavicular nodes  RESP: lungs clear to auscultation - no rales, rhonchi or wheezes  CV: regular rhythm, normal rate, no rub, no murmur  EDEMA: no LE edema bilaterally  ABDOMEN: soft, nondistended, nontender, bowel sounds normal  MS: extremities normal - no gross deformities noted, no evidence of inflammation in joints, no muscle tenderness  SKIN: no rash  NEURO: normal strength and tone, sensory exam grossly normal, mentation intact and speech normal  PSYCH: mentation appears normal and affect normal/bright    Results:   Labs and imaging were ordered for this visit and reviewed by me.  Recent Results (from the past 336 hour(s))   Protein  random urine    Collection Time: 06/15/23  7:36 AM   Result Value Ref Range    Total Protein Urine mg/dL <6.0   mg/dL    Total Protein UR MG/MG CR      Creatinine Urine mg/dL 32.7 mg/dL   Routine UA with microscopic    Collection Time: 06/15/23  7:36 AM   Result Value Ref Range    Color Urine Straw Colorless, Straw, Light Yellow, Yellow    Appearance Urine Clear Clear    Glucose Urine Negative Negative mg/dL    Bilirubin Urine Negative Negative    Ketones Urine Negative Negative mg/dL    Specific Gravity Urine 1.005 1.003 - 1.035    Blood Urine Negative Negative    pH Urine 5.5 5.0 - 7.0    Protein Albumin Urine Negative Negative mg/dL    Urobilinogen Urine Normal Normal, 2.0 mg/dL    Nitrite Urine Negative Negative    Leukocyte Esterase Urine Moderate (A) Negative    Bacteria Urine Few (A) None Seen /HPF    RBC Urine 1 <=2 /HPF    WBC Urine 14 (H) <=5 /HPF    Squamous Epithelials Urine 1 <=1 /HPF   CBC with platelets    Collection Time: 06/15/23  7:36 AM   Result Value Ref Range    WBC Count 6.6 4.0 - 11.0 10e3/uL    RBC Count 4.57 3.80 - " 5.20 10e6/uL    Hemoglobin 13.6 11.7 - 15.7 g/dL    Hematocrit 41.7 35.0 - 47.0 %    MCV 91 78 - 100 fL    MCH 29.8 26.5 - 33.0 pg    MCHC 32.6 31.5 - 36.5 g/dL    RDW 13.6 10.0 - 15.0 %    Platelet Count 379 150 - 450 10e3/uL   Partial thromboplastin time    Collection Time: 06/15/23  7:36 AM   Result Value Ref Range    aPTT 28 22 - 38 Seconds   INR    Collection Time: 06/15/23  7:36 AM   Result Value Ref Range    INR 0.95 0.85 - 1.15   Phosphorus    Collection Time: 06/15/23  7:36 AM   Result Value Ref Range    Phosphorus 3.1 2.5 - 4.5 mg/dL   Uric acid    Collection Time: 06/15/23  7:36 AM   Result Value Ref Range    Uric Acid 4.8 2.4 - 5.7 mg/dL   Lipid Profile    Collection Time: 06/15/23  7:36 AM   Result Value Ref Range    Cholesterol 180 <200 mg/dL    Triglycerides 89 <150 mg/dL    Direct Measure HDL 86 >=50 mg/dL    LDL Cholesterol Calculated 76 <=100 mg/dL    Non HDL Cholesterol 94 <130 mg/dL   Comprehensive metabolic panel    Collection Time: 06/15/23  7:36 AM   Result Value Ref Range    Sodium 140 136 - 145 mmol/L    Potassium 4.0 3.4 - 5.3 mmol/L    Chloride 105 98 - 107 mmol/L    Carbon Dioxide (CO2) 26 22 - 29 mmol/L    Anion Gap 9 7 - 15 mmol/L    Urea Nitrogen 15.5 8.0 - 23.0 mg/dL    Creatinine 0.90 0.51 - 0.95 mg/dL    Calcium 9.6 8.8 - 10.2 mg/dL    Glucose 101 (H) 70 - 99 mg/dL    Alkaline Phosphatase 123 (H) 35 - 104 U/L    AST 16 0 - 45 U/L    ALT 12 0 - 50 U/L    Protein Total 6.9 6.4 - 8.3 g/dL    Albumin 4.2 3.5 - 5.2 g/dL    Bilirubin Total 0.3 <=1.2 mg/dL    GFR Estimate 70 >60 mL/min/1.73m2                 Again, thank you for allowing me to participate in the care of your patient.      Sincerely,    Gavino Khan MD

## 2023-06-15 NOTE — PROGRESS NOTES
Living Kidney Donor Consent per OPTN Policy 14.2 for Independent Living Donor Advocate (DAYNE)    Organ Type: unrelated, kidney  Presenting Information:  Kim Pritchett presents to [facility], Solid [facility] Transplant [facility] to complete a living donor evaluation since she is interested in becoming a kidney donor for the daughter of close friends.      Written assurance has been obtained from the potential donor that he/she:   Is willing to donate  Is free from inducement and coercion  Has been informed that the he/she may decline to donate at any time  Has been informed that transplant centers must:   A) Offer donors an opportunity to discontinue the donor consent or evaluation process in a way that is protected and confidential  B) Provide an independent living donor advocate (DAYNE) to assist the potential donor during this process    The following was presented to the potential donor in a language in which the potential donor is able to engage in meaningful dialogue:   Education and instruction about all phases of the living donation process including:   Consent  Medical and psychosocial evaluation  Information about the surgical procedure  Pre and post operative care  Benefits of post operative follow up  Disclosure that the recovery hospital will take all reasonable precautions to provide confidentiality for the donor/recipient  Disclosure that it is a federal crime for any person to knowingly acquire, obtain or otherwise transfer any human organ for valuable consideration  Disclosure that the recovery hospital must provide an independent living donor advocate (DAYNE)  Disclosure that health information obtained during the evaluation is subject to the same regulations as all records and could reveal conditions that must be reported to local, state, or federal public health authorities  Disclosure that the [facility] hospital is required to report living donor follow up  information at 6 months, 1 year, and 2 years, and that the potential donor must commit to post operative follow up testing coordinated by the [facility]    Disclosure has been provided that these risks may be transient or permanent & include but are not limited to:  Potential psychosocial risks:  Problems with body image  Post-surgery depression or anxiety  Feelings of emotional distress or bereavement if recipient experiences any recurrent disease or in the event of the recipient s death  Impact of donation on the donor s lifestyle, such as limited ability to exercise in the short term post operative recovery period, no driving for the first 2 weeks post op or until the donor is no longer needing pain medications that impair the ability to drive.      Potential financial impacts:  Personal expenses of travel, housing, , lost wages related to donation might not be reimbursed. However, resources may be available to defray some donation-related expenses.   Need for life-long follow up at the donor s expense  Loss of employment or income  Negative impact on the ability to obtain future employment  Negative impact on the ability to obtain, maintain, or afford health, disability, and life insurance  Future health problems experienced by living donors following donation may not be covered by the recipient s insurance      PREPARATION FOR DONATION, RECOVERY, AND POTENTIAL SHORT-LONG-TERM OUTCOMES:  Understanding of the Living Donation Process:  We discussed the role of Independent Living Donor Advocate.  Short and long term medical and psychosocial risks to both, donor and recipient were reviewed and she is able to teach back to me these risks.  Post surgical restrictions (2 weeks no driving, 6 weeks no lifting over 10 lbs) were reviewed and she appears capable of adhering to the post surgical requirements. The need for a caregiver was discussed and she has a caregiver that will manage her care needs .   The risk of poor psychosocial outcome including problems with body image, post-surgery depression or anxiety, or feelings of emotional distress or bereavement if recipient experiences any recurrent disease, poor outcome or death was reviewed.  Additionally, potential financial implications, including the risk of having difficulty obtaining health care insurance, life insurance, disability insurance, or long term care insurance were reviewed, as were available donor grants to assist with donor related expenses.      IMPRESSIONS/RECOMMENDATIONS:  Kim Pritchett appears highly motivated to donate a kidney to her friends' adult daughter.  She appears capable of understanding this information and making an informed medical decision.  As DAYNE, no concerns were identified today.  Kim has my contact information and is aware that I am available thoughout the donation process.    Contact Information:  HEAVEN Marina, [facility]  Independent Living Donor Advocate  [facility]  Solid Organ Transplant Clinic, [address] [place] [address]  [id number]  Direct:  [phone] Cell Phone  E-Mail:  [email or web address]      Time Spent: 30 minutes

## 2023-06-15 NOTE — NURSING NOTE
Saw Kim Pritchett in clinic on 6/15/23 for Living Kidney Donor Evaluation.    Kim is interested in donation for Maria Del Carmen Reynoso who is a friend's daughter.   Kim has a townhome in [place] but lives most of the time in the [facility].    I provided a folder which included copies of the following:  Living Kidney Donor Evaluation Consent   Paired Exchange Consent   Donor Shield Pamphlet   Living Donor Collective Study information   Kidney for Life pamphlet   Kidney Donors are Heroes! Study synopsis   Most current SRTR data.        I also provided a parking pass and food voucher.      I reviewed the Living Kidney Donor Evaluation Consent, dated 7-6-2020 and Paired Exchange/ NDD consent dated 9-.  I answered any question.   Evaluation Notes: Patient was advised by Dr Khan and myself to see PCP for Chest CT with low dose contrast due to history of smoking.  Also she needs to stay up to date with Dermatology seeing every 6 months.    We discussed advance voucher donation.  Need to discuss further once approved and learn status of recipient.  Patient preferred timeline for donation is Fall of this year.Buccal swab obtained and sent to HLA lab.     Devorah Miller RN  Living Donor Coordinator  06/15/2023 1:38 PM

## 2023-06-15 NOTE — PROGRESS NOTES
Psychosocial Evaluation  Living Organ Donation per OPTN Policy 14.1.A  Organ Type: living kidney donor   Presenting Information: Kim presents to the [facility], [facility] to complete a psychosocial evaluation since she is interested in becoming a living kidney donor for her friend's daughter.    PERSONAL BACKGROUND:  Current Living Situation: Kim has a town home in [place], but she and her  primarily live [place], [place] now.     Education/Employment/Financial Situation: Kim completed high school. She retired from  work in 2020. No financial concerns reported.     Health Insurance Status: [facility] Medicare Advantage     Family History: Kim is  and has 2 adult children. She has 1 sister and her dad is still living. She grew up mostly in [place] as a child.     General Health: Has a PCP and doctor's regularly. States that she thinks she is in good health. Has a health care directive naming her spouse as her decision maker and will bring it in for scanning.     Mental Health: The donor denies any past or present treatment for mental health issues, such as anxiety, depression, bipolar disorder, or disorders of thought such as schizophrenia or schizoaffective disorder.  There is no history of personality disorder or eating disorders.  The donor denies any need to see a counselor or therapist at this time.  The donor denies any past suicidal ideation, plans, or past attempts.  The donor denies any use of psychotropic medications at this time or in the past.  The donor denies any past history of hospitalization for psychiatric illnesses.  The donor denies any past history of ADHD or ADD.  The donor denies any history of educational issues or need for special educational services in their past history.      Alcohol and Drug Use/Abuse/Dependency: Kim reports that she consumes approximately 14  "servings of alcohol per week ( a serving is defined here as one, 12 oz beer, or one 4 oz glass of wine, or one 1 /2 oz of hard liquor). She drinks at least 1-2 glasses of wine per night and this has been since retiring in 2020. She would be willing to cut down and thinks that she could easily just have 1 glass per night. SW discussed NIAAA guidelines that say heavy drinking for an adult female is >7 drinks per week. Kim also uses THC (Delta 9) gummies to sleep nightly.     The donor denies any past history of abuse or dependency on alcohol or illicit drugs. The donor denies any past history of negative consequences of use of alcohol or drugs such as a DUI, relationship problems, problems with fulfilling parenting or other care giving responsibilities, or problems with work performance.       Cigarette Use: Quit in 2017     Legal: none    Coping with major surgery/associated stress: She states that she internalizes her stress and \"goes to bed.\"     Support System: Her spouse is supportive of her wanting to donate and will be her caregiver post op.     DONOR SPECIFIC INFORMATION:  Relationship to Recipient: friend     Decision Process/Motivation to Donate: Kim is good friends with her recipient's parents and feels for them, as they lost a son when he was a child and now their daughter needs a kidney. Kim states that her daughter is the same age as the recipient and really wants to her help her. She is agreeable to PEP and thinks it is a \"great program.\" Denies worries about donation and feels like she understands the risks of living with one kidney.     High risk behaviors as defined by [place] Public Health Services (PHS) that have potential to increase risk of disease transmission were reviewed and no risks identified.     PREPARATION FOR DONATION, RECOVERY, AND POTENTIAL SHORT-LONG-TERM OUTCOMES:  Understanding of the Living Donation Process:  We discussed the role of living donor .  Short and long " term medical and psychosocial risks to both, donor and recipient were reviewed and she expressed understanding.  Post surgical restrictions (2 weeks no driving, 6 weeks no lifting over 10 lbs) were reviewed and she appears capable of adhering to the post surgical requirements. The need for a caregiver was discussed and she has a good support from her spouse.  The risk of poor psychosocial outcome including problems with body image, post-surgery depression or anxiety, or feelings of emotional distress or bereavement if recipient experiences any recurrent disease, poor outcome or death was reviewed.  Additionally, potential financial implications, including the risk of having difficulty obtaining health care insurance, life insurance, disability insurance, or long term care insurance were reviewed, as were available donor grants to assist with donor related expenses.      We also discussed some unique issues that arise with paired kidney donation, which include the uncertainty of the timing and the importance of having a employment situation and support system that is able to provide sustained support and flexibility.    Kim appears capable of understanding this information and making an informed medical decision.    Impressions/Recommendations:   Kim is highly motivated to donate a kidney to her friend's daughter. Her decision to donate is free of inducement, coercion, or other undue pressure.  Her housing, finances and employment are stable.  No current/active mental health or chemical abuse issues were identified, however, she is drinking ~14 drinks per week, which per NIAAA guidelines is heavy drinking. She would benefit from cutting down for her general health. She is agreeable to do so. The need for a caregiver was reviewed and she is able to identify a plan to meet her post operative care needs. She appears capable of making an informed medical decision.  No psychosocial contraindications to living organ  donation were identified and  I support Kim s desire to donate a kidney to her friend's daughter.       Contact Information:   JOJO Jacobsen, [facility]   Living Donor   [facility], [facility], [facility]  Direct: [phone]  E-Mail: [email or web address]      Time Spent: 30 minutes

## 2023-06-15 NOTE — LETTER
6/15/2023       RE: Kim Pritchett  [address]  [place] [address]     Dear Colleague,    Thank you for referring your patient, Kim Pritchett, to the [facility] [place] [facility]. Please see a copy of my visit note below.    TRANSPLANT NEPHROLOGY DONOR EVALUATION    Assessment and Plan:  # Prospective Kidney Transplant Donor: Patient with one issue that needs to be addressed prior to donation. Patient's blood pressure is acceptable at this visit, kidney function appears to be possibly low with Iohexol pending, and urinalysis is abnormal.    # Superficial thrombophlebitis of greater saphenous vein: completed 45 days treatment with Xarelto    # Chronic NSAID use: used Advil PM daily for close to 5 years in past. Has discontinued it for sometime now.    # Asymptomatic pyuria: Recommend repeat urinalysis    # Low creatinine based GFR: follow iohexol based GFR    Discussed the risks of donating a kidney, including the surgical risk and the possible risks of living with one kidney.    Education about expected post-donation kidney function and how chronic kidney disease (CKD) and end stage kidney disease (ESKD) might potentially impact the donor in the future, include, but not limited to:       - On average, donors will have 25-35% permanent loss of kidney function at donation.       - Baseline risk of ESKD may slightly exceed that of members of the general population with the same demographic profile.       - Donor risks must be interpreted in light of known epidemiology of both CKD or ESKD, such as that CKD generally develops in midlife (40-50 years old) and ESKD generally develops after age 60.       - Medical evaluation of young potential donors cannot predict lifetime risk of CKD or ESKD.       - Donors may be at higher risk for CKD if they sustain damage to the remaining kidney.       - Development of CKD and progression of ESKD may be  more rapid with only 1 kidney.       - Some type of kidney replacement therapy, either kidney transplant or dialysis, is required when reaching ESKD.    Potential medical or surgical risks include, but not limited to:       - Death.       - Scars, pain, fatigue, and other consequences typical of any surgical procedure.       - Decreased kidney function.       - Abdominal or bowel symptoms, such as bloating and nausea, and developing bowel obstruction.       - Kidney failure (ESKD) and the need for a kidney transplant or dialysis for the donor.       - Impact of obesity, hypertension, or other donor-specific medical conditions on morbidity and mortality of the potential donor.    Patients overall evaluation will be discussed with the transplant team and a final recommendation on the patients' suitability to be a kidney transplant donor will be made at that time.    Consult:  Kim Pritchett was seen in consultation at the request of Dr. Chase Robledo for evaluation as a potential kidney transplant donor.    Reason for Visit:  Kim Pritchett is a 67 year old female who presents for a kidney donor evaluation.  Patient would like to donate to her daughter's friend.    Patient seen and discussed with Dr Erin Andrade M.D  Nephrology Fellow  [phone]    Attestation:  This patient has been seen and evaluated by me, Gavino Khan MD.  I have reviewed the note and agree with plan of care as documented by the fellow.       Present Condition and Donor-Related Medical History:    Energy level is good and has been normal.  She is active and does get some exercise.  Denies any chest pain or shortness of breath with exertion.  Appetite is good and no recent weight change.  No nausea, vomiting or diarrhea.  No fever, sweats or chills.  No leg swelling.  No pain or burning with urination.         Kidney Disease Hx:       h/o Kidney Problems: No  Family h/o Genetic Kidney Disease: No       h/o Hypertension: No    Usual  Blood Pressure: Not checked       h/o Protein in Urine: No  h/o Blood in Urine: No       h/o Kidney Stones: No  h/o Kidney Injury: No       h/o Recurrent UTI: No  h/o Genitourinary Problems: No       h/o Chronic NSAID Use: Yes, daily          Other Medical Hx:       h/o Diabetes: No             h/o Gastrointestinal, Pancreas or Liver Problems: No       h/o Lung or Heart Problems: No       h/o Hematologic Problems: No  h/o Bleeding or Clotting Problems: Yes: completed treatment for superficial thrombophlebitis       h/o Cancer: No       h/o Infection Problems: No       h/o Gestational DM: No      h/o Preeclampsia: No         Skin Cancer Risk:       h/o more than 50 moles: No       h/o extensive sun exposure: No       h/o melanoma: No       Family h/o melanoma: No         Mental Health Assessment:       h/o Depression: No       h/o Psychiatric Illness: No       h/o Suicidal Attempt(s): No    COVID Status:  Vaccination Up To Date: Yes  H/o COVID Infection: Yes , no lingering symptoms    Review Of Systems:   A comprehensive review of systems was obtained and negative, except as noted in the HPI or PMH.    Past Medical History:   History was taken from the patient as noted below.  Past Medical History:   Diagnosis Date     Allergic rhinitis due to pollen      Basal cell carcinoma 2022 2023     Iritis     HLA-B27 gene     Varicose veins of both lower extremities        Past Social History:   Past Surgical History:   Procedure Laterality Date     TONSILLECTOMY  1964     Personal or family history of anesthesia problems: No    Family History:   Family History   Problem Relation Age of Onset     Esophageal Cancer Mother      Colon Cancer Mother      Skin Cancer Mother      Hypertension Father      Heart Disease Father      Hypertension Sister      Heart Disease Paternal Grandmother      Obesity Paternal Grandmother      No Known Problems Daughter      No Known Problems Son           Specific Family History in First Degree  Relatives:       FH of Kidney Dz: No FH of Diabetes: No       FH of Hypertension: No  FH of CAD: Yes        FH of Cancer: No  FH of Kidney Cancer: No    Personal History:   Social History     Socioeconomic History     Marital status:      Spouse name: Not on file     Number of children: Not on file     Years of education: Not on file     Highest education level: Not on file   Occupational History     Not on file   Tobacco Use     Smoking status: Former     Years: 35.00     Types: Cigarettes     Quit date:      Years since quittin.4     Smokeless tobacco: Never   Vaping Use     Vaping status: Every Day     Substances: Nicotine   Substance and Sexual Activity     Alcohol use: Yes     Comment: 1 drink daily     Drug use: Never     Sexual activity: Not on file   Other Topics Concern     Not on file   Social History Narrative     Not on file     Social Determinants of Health     Financial Resource Strain: Not on file   Food Insecurity: Not on file   Transportation Needs: Not on file   Physical Activity: Not on file   Stress: Not on file   Social Connections: Not on file   Intimate Partner Violence: Not on file   Housing Stability: Not on file          Specific Social History:       Health Insurance Status: Yes       Employment Status: Retired  Occupation:                        Living Arrangements: lives with their spouse       Social Support: Yes       Presence of increased risk for disease transmission behaviors as defined by PHS guidelines: No        Allergies:  No Known Allergies    Medications:  Current Outpatient Medications   Medication Sig     melatonin 5 MG tablet Take 5 mg by mouth nightly as needed for sleep     No current facility-administered medications for this visit.     There are no discontinued medications.      Vitals:      6/15/2023     8:41 AM 6/15/2023     8:48 AM 6/15/2023     8:49 AM   Vital Signs   Systolic 123 114 111   Diastolic 82 74 73   Pulse 69    "  Weight (LB) 154 lb 14.4 oz     Height 5' 6.5\"     BMI (Calculated) 24.63     O2 97 %         Exam:   GENERAL APPEARANCE: alert and no distress  HENT: mouth without ulcers or lesions  NECK: supple, no adenopathy  LYMPHATICS: no cervical or supraclavicular nodes  RESP: lungs clear to auscultation - no rales, rhonchi or wheezes  CV: regular rhythm, normal rate, no rub, no murmur  EDEMA: no LE edema bilaterally  ABDOMEN: soft, nondistended, nontender, bowel sounds normal  MS: extremities normal - no gross deformities noted, no evidence of inflammation in joints, no muscle tenderness  SKIN: no rash  NEURO: normal strength and tone, sensory exam grossly normal, mentation intact and speech normal  PSYCH: mentation appears normal and affect normal/bright    Results:   Labs and imaging were ordered for this visit and reviewed by me.  Recent Results (from the past 336 hour(s))   Protein  random urine    Collection Time: 06/15/23  7:36 AM   Result Value Ref Range    Total Protein Urine mg/dL <6.0   mg/dL    Total Protein UR MG/MG CR      Creatinine Urine mg/dL 32.7 mg/dL   Routine UA with microscopic    Collection Time: 06/15/23  7:36 AM   Result Value Ref Range    Color Urine Straw Colorless, Straw, Light Yellow, Yellow    Appearance Urine Clear Clear    Glucose Urine Negative Negative mg/dL    Bilirubin Urine Negative Negative    Ketones Urine Negative Negative mg/dL    Specific Gravity Urine 1.005 1.003 - 1.035    Blood Urine Negative Negative    pH Urine 5.5 5.0 - 7.0    Protein Albumin Urine Negative Negative mg/dL    Urobilinogen Urine Normal Normal, 2.0 mg/dL    Nitrite Urine Negative Negative    Leukocyte Esterase Urine Moderate (A) Negative    Bacteria Urine Few (A) None Seen /HPF    RBC Urine 1 <=2 /HPF    WBC Urine 14 (H) <=5 /HPF    Squamous Epithelials Urine 1 <=1 /HPF   CBC with platelets    Collection Time: 06/15/23  7:36 AM   Result Value Ref Range    WBC Count 6.6 4.0 - 11.0 10e3/uL    RBC Count 4.57 3.80 - " 5.20 10e6/uL    Hemoglobin 13.6 11.7 - 15.7 g/dL    Hematocrit 41.7 35.0 - 47.0 %    MCV 91 78 - 100 fL    MCH 29.8 26.5 - 33.0 pg    MCHC 32.6 31.5 - 36.5 g/dL    RDW 13.6 10.0 - 15.0 %    Platelet Count 379 150 - 450 10e3/uL   Partial thromboplastin time    Collection Time: 06/15/23  7:36 AM   Result Value Ref Range    aPTT 28 22 - 38 Seconds   INR    Collection Time: 06/15/23  7:36 AM   Result Value Ref Range    INR 0.95 0.85 - 1.15   Phosphorus    Collection Time: 06/15/23  7:36 AM   Result Value Ref Range    Phosphorus 3.1 2.5 - 4.5 mg/dL   Uric acid    Collection Time: 06/15/23  7:36 AM   Result Value Ref Range    Uric Acid 4.8 2.4 - 5.7 mg/dL   Lipid Profile    Collection Time: 06/15/23  7:36 AM   Result Value Ref Range    Cholesterol 180 <200 mg/dL    Triglycerides 89 <150 mg/dL    Direct Measure HDL 86 >=50 mg/dL    LDL Cholesterol Calculated 76 <=100 mg/dL    Non HDL Cholesterol 94 <130 mg/dL   Comprehensive metabolic panel    Collection Time: 06/15/23  7:36 AM   Result Value Ref Range    Sodium 140 136 - 145 mmol/L    Potassium 4.0 3.4 - 5.3 mmol/L    Chloride 105 98 - 107 mmol/L    Carbon Dioxide (CO2) 26 22 - 29 mmol/L    Anion Gap 9 7 - 15 mmol/L    Urea Nitrogen 15.5 8.0 - 23.0 mg/dL    Creatinine 0.90 0.51 - 0.95 mg/dL    Calcium 9.6 8.8 - 10.2 mg/dL    Glucose 101 (H) 70 - 99 mg/dL    Alkaline Phosphatase 123 (H) 35 - 104 U/L    AST 16 0 - 45 U/L    ALT 12 0 - 50 U/L    Protein Total 6.9 6.4 - 8.3 g/dL    Albumin 4.2 3.5 - 5.2 g/dL    Bilirubin Total 0.3 <=1.2 mg/dL    GFR Estimate 70 >60 mL/min/1.73m2                 Again, thank you for allowing me to participate in the care of your patient.      Sincerely,    Gavino Khan MD

## 2023-06-15 NOTE — PROGRESS NOTES
TRANSPLANT NEPHROLOGY DONOR EVALUATION    Assessment and Plan:  # Prospective Kidney Transplant Donor: Patient with one issue that needs to be addressed prior to donation. Patient's blood pressure is acceptable at this visit, kidney function appears to be possibly low with Iohexol pending, and urinalysis is abnormal.    # Superficial thrombophlebitis of greater saphenous vein: completed 45 days treatment with Xarelto    # Chronic NSAID use: used Advil PM daily for close to 5 years in past. Has discontinued it for sometime now.    # Asymptomatic pyuria: Recommend repeat urinalysis    # Low creatinine based GFR: follow iohexol based GFR    Discussed the risks of donating a kidney, including the surgical risk and the possible risks of living with one kidney.    Education about expected post-donation kidney function and how chronic kidney disease (CKD) and end stage kidney disease (ESKD) might potentially impact the donor in the future, include, but not limited to:       - On average, donors will have 25-35% permanent loss of kidney function at donation.       - Baseline risk of ESKD may slightly exceed that of members of the general population with the same demographic profile.       - Donor risks must be interpreted in light of known epidemiology of both CKD or ESKD, such as that CKD generally develops in midlife (40-50 years old) and ESKD generally develops after age 60.       - Medical evaluation of young potential donors cannot predict lifetime risk of CKD or ESKD.       - Donors may be at higher risk for CKD if they sustain damage to the remaining kidney.       - Development of CKD and progression of ESKD may be more rapid with only 1 kidney.       - Some type of kidney replacement therapy, either kidney transplant or dialysis, is required when reaching ESKD.    Potential medical or surgical risks include, but not limited to:       - Death.       - Scars, pain, fatigue, and other consequences typical of any  surgical procedure.       - Decreased kidney function.       - Abdominal or bowel symptoms, such as bloating and nausea, and developing bowel obstruction.       - Kidney failure (ESKD) and the need for a kidney transplant or dialysis for the donor.       - Impact of obesity, hypertension, or other donor-specific medical conditions on morbidity and mortality of the potential donor.    Patients overall evaluation will be discussed with the transplant team and a final recommendation on the patients' suitability to be a kidney transplant donor will be made at that time.    Consult:  Kim Pritchett was seen in consultation at the request of Dr. Chase Robledo for evaluation as a potential kidney transplant donor.    Reason for Visit:  Kim Pritchett is a 67 year old female who presents for a kidney donor evaluation.  Patient would like to donate to her daughter's friend.    Patient seen and discussed with Dr Erin Andrade M.D  Nephrology Fellow  [phone]    Attestation:  This patient has been seen and evaluated by me, Gavino Khan MD.  I have reviewed the note and agree with plan of care as documented by the fellow.       Present Condition and Donor-Related Medical History:    Energy level is good and has been normal.  She is active and does get some exercise.  Denies any chest pain or shortness of breath with exertion.  Appetite is good and no recent weight change.  No nausea, vomiting or diarrhea.  No fever, sweats or chills.  No leg swelling.  No pain or burning with urination.         Kidney Disease Hx:       h/o Kidney Problems: No  Family h/o Genetic Kidney Disease: No       h/o Hypertension: No    Usual Blood Pressure: Not checked       h/o Protein in Urine: No  h/o Blood in Urine: No       h/o Kidney Stones: No  h/o Kidney Injury: No       h/o Recurrent UTI: No  h/o Genitourinary Problems: No       h/o Chronic NSAID Use: Yes, daily          Other Medical Hx:       h/o Diabetes: No             h/o  Gastrointestinal, Pancreas or Liver Problems: No       h/o Lung or Heart Problems: No       h/o Hematologic Problems: No  h/o Bleeding or Clotting Problems: Yes: completed treatment for superficial thrombophlebitis       h/o Cancer: No       h/o Infection Problems: No       h/o Gestational DM: No      h/o Preeclampsia: No         Skin Cancer Risk:       h/o more than 50 moles: No       h/o extensive sun exposure: No       h/o melanoma: No       Family h/o melanoma: No         Mental Health Assessment:       h/o Depression: No       h/o Psychiatric Illness: No       h/o Suicidal Attempt(s): No    COVID Status:  Vaccination Up To Date: Yes  H/o COVID Infection: Yes , no lingering symptoms    Review Of Systems:   A comprehensive review of systems was obtained and negative, except as noted in the HPI or PMH.    Past Medical History:   History was taken from the patient as noted below.  Past Medical History:   Diagnosis Date     Allergic rhinitis due to pollen      Basal cell carcinoma 2022 2023     Iritis     [id number] gene     Varicose veins of both lower extremities        Past Social History:   Past Surgical History:   Procedure Laterality Date     TONSILLECTOMY  1964     Personal or family history of anesthesia problems: No    Family History:   Family History   Problem Relation Age of Onset     Esophageal Cancer Mother      Colon Cancer Mother      Skin Cancer Mother      Hypertension Father      Heart Disease Father      Hypertension Sister      Heart Disease Paternal Grandmother      Obesity Paternal Grandmother      No Known Problems Daughter      No Known Problems Son           Specific Family History in First Degree Relatives:       FH of Kidney Dz: No FH of Diabetes: No       FH of Hypertension: No  FH of CAD: Yes        FH of Cancer: No  FH of Kidney Cancer: No    Personal History:   Social History     Socioeconomic History     Marital status:      Spouse name: Not on file     Number of children: Not  "on file     Years of education: Not on file     Highest education level: Not on file   Occupational History     Not on file   Tobacco Use     Smoking status: Former     Years: 35.00     Types: Cigarettes     Quit date: 2017     Years since quittin.4     Smokeless tobacco: Never   Vaping Use     Vaping status: Every Day     Substances: Nicotine   Substance and Sexual Activity     Alcohol use: Yes     Comment: 1 drink daily     Drug use: Never     Sexual activity: Not on file   Other Topics Concern     Not on file   Social History Narrative     Not on file     Social Determinants of Health     Financial Resource Strain: Not on file   Food Insecurity: Not on file   Transportation Needs: Not on file   Physical Activity: Not on file   Stress: Not on file   Social Connections: Not on file   Intimate Partner Violence: Not on file   Housing Stability: Not on file          Specific Social History:       Health Insurance Status: Yes       Employment Status: Retired  Occupation:                        Living Arrangements: lives with their spouse       Social Support: Yes       Presence of increased risk for disease transmission behaviors as defined by [facility] guidelines: No        Allergies:  No Known Allergies    Medications:  Current Outpatient Medications   Medication Sig     melatonin 5 MG tablet Take 5 mg by mouth nightly as needed for sleep     No current facility-administered medications for this visit.     There are no discontinued medications.      Vitals:      6/15/2023     8:41 AM 6/15/2023     8:48 AM 6/15/2023     8:49 AM   Vital Signs   Systolic 123 114 111   Diastolic 82 74 73   Pulse 69     Weight (LB) 154 lb 14.4 oz     Height 5' 6.5\"     BMI (Calculated) 24.63     O2 97 %         Exam:   GENERAL APPEARANCE: alert and no distress  HENT: mouth without ulcers or lesions  NECK: supple, no adenopathy  LYMPHATICS: no cervical or supraclavicular nodes  RESP: lungs clear to auscultation - no " rales, rhonchi or wheezes  CV: regular rhythm, normal rate, no rub, no murmur  EDEMA: no LE edema bilaterally  ABDOMEN: soft, nondistended, nontender, bowel sounds normal  MS: extremities normal - no gross deformities noted, no evidence of inflammation in joints, no muscle tenderness  SKIN: no rash  NEURO: normal strength and tone, sensory exam grossly normal, mentation intact and speech normal  PSYCH: mentation appears normal and affect normal/bright    Results:   Labs and imaging were ordered for this visit and reviewed by me.  Recent Results (from the past 336 hour(s))   Protein  random urine    Collection Time: 06/15/23  7:36 AM   Result Value Ref Range    Total Protein Urine mg/dL <6.0   mg/dL    Total Protein UR MG/MG CR      Creatinine Urine mg/dL 32.7 mg/dL   Routine UA with microscopic    Collection Time: 06/15/23  7:36 AM   Result Value Ref Range    Color Urine Straw Colorless, Straw, Light Yellow, Yellow    Appearance Urine Clear Clear    Glucose Urine Negative Negative mg/dL    Bilirubin Urine Negative Negative    Ketones Urine Negative Negative mg/dL    Specific Gravity Urine 1.005 1.003 - 1.035    Blood Urine Negative Negative    pH Urine 5.5 5.0 - 7.0    Protein Albumin Urine Negative Negative mg/dL    Urobilinogen Urine Normal Normal, 2.0 mg/dL    Nitrite Urine Negative Negative    Leukocyte Esterase Urine Moderate (A) Negative    Bacteria Urine Few (A) None Seen /HPF    RBC Urine 1 <=2 /HPF    WBC Urine 14 (H) <=5 /HPF    Squamous Epithelials Urine 1 <=1 /HPF   CBC with platelets    Collection Time: 06/15/23  7:36 AM   Result Value Ref Range    WBC Count 6.6 4.0 - 11.0 10e3/uL    RBC Count 4.57 3.80 - 5.20 10e6/uL    Hemoglobin 13.6 11.7 - 15.7 g/dL    Hematocrit 41.7 35.0 - 47.0 %    MCV 91 78 - 100 fL    MCH 29.8 26.5 - 33.0 pg    MCHC 32.6 31.5 - 36.5 g/dL    RDW 13.6 10.0 - 15.0 %    Platelet Count 379 150 - 450 10e3/uL   Partial thromboplastin time    Collection Time: 06/15/23  7:36 AM   Result  Value Ref Range    aPTT 28 22 - 38 Seconds   INR    Collection Time: 06/15/23  7:36 AM   Result Value Ref Range    INR 0.95 0.85 - 1.15   Phosphorus    Collection Time: 06/15/23  7:36 AM   Result Value Ref Range    Phosphorus 3.1 2.5 - 4.5 mg/dL   Uric acid    Collection Time: 06/15/23  7:36 AM   Result Value Ref Range    Uric Acid 4.8 2.4 - 5.7 mg/dL   Lipid Profile    Collection Time: 06/15/23  7:36 AM   Result Value Ref Range    Cholesterol 180 <200 mg/dL    Triglycerides 89 <150 mg/dL    Direct Measure HDL 86 >=50 mg/dL    LDL Cholesterol Calculated 76 <=100 mg/dL    Non HDL Cholesterol 94 <130 mg/dL   Comprehensive metabolic panel    Collection Time: 06/15/23  7:36 AM   Result Value Ref Range    Sodium 140 136 - 145 mmol/L    Potassium 4.0 3.4 - 5.3 mmol/L    Chloride 105 98 - 107 mmol/L    Carbon Dioxide (CO2) 26 22 - 29 mmol/L    Anion Gap 9 7 - 15 mmol/L    Urea Nitrogen 15.5 8.0 - 23.0 mg/dL    Creatinine 0.90 0.51 - 0.95 mg/dL    Calcium 9.6 8.8 - 10.2 mg/dL    Glucose 101 (H) 70 - 99 mg/dL    Alkaline Phosphatase 123 (H) 35 - 104 U/L    AST 16 0 - 45 U/L    ALT 12 0 - 50 U/L    Protein Total 6.9 6.4 - 8.3 g/dL    Albumin 4.2 3.5 - 5.2 g/dL    Bilirubin Total 0.3 <=1.2 mg/dL    GFR Estimate 70 >60 mL/min/1.73m2

## 2023-06-15 NOTE — NURSING NOTE
Chief Complaint   Patient presents with     Blood Draw     Labs drawn via piv by rn in lab. VS taken.     Labs drawn from PIV placed by RN. Line flushed with saline. Vitals taken. Pt checked in for appointment(s).    Jayce Rosas RN

## 2023-06-15 NOTE — LETTER
6/15/2023         RE: Kim Pritchett  [address]  [facility] [address]        Dear Colleague,    Thank you for referring your patient, Kim Pritchett, to the [facility]. Please see a copy of my visit note below.      Transplant Surgery Consult Note    Medical record number: [id number]  YOB: 1955,   Consult requested by the patient for evaluation of kidney donation candidacy.    Assessment and Recommendations: Ms. Pritchett appears to be a good candidate for kidney donation at this point in the evaluation. The following issues will need to be addressed prior to formal review:    CT abdomen and pelvis with contrast to be ordered for assessment vascular anatomy: Yes  Dietician consult ordered: Yes  Social work consult ordered: Yes  CXR and EKG ordered:  Yes  Transplant donor labs ordered to include HLA, ABOx2, Cr, Iohexol GFR or Cr clearance, virology etc.       Patient would like to donate to a friend's daughter. The risks of the surgical procedure including but not limited to the rare risk of mortality discussed in detail. Patient verbalized good understanding and had several pertinent questions which were answered.      The majority of our visit today was spent in counseling regarding the medical and surgical risks of kidney donation; the typical liz-and post-operative experience and recovery/return to work pattern; restrictions related to the surgery (driving; lifting; exercise).      We also talked about post-op visits and longer term health care maintenance, as well as the implications of having one remaining kidney. This discussion included, but was not limited to rates of complications such as bleeding, infection, need for transfusion, reoperation, other organ injury, future bowel obstruction, incisional hernia, port site pain, venous thrombosis, pulmonary embolism, renal failure, and death (3 per 10,000).     We discussed long-term risks in detail.  I discussed the  articles suggesting a small increase in ESKD in donors and their limitations    We discussed recovery, including length of hospital stay; no new meds other than pain meds on discharge; limitations after surgery (no driving for a couple of weeks; no lifting over 10 lbs or exercise stretching abdominal muscles for 6 weeks; return to work; and fatigue for the first few weeks postdonation.  I informed her of our donor survey results on donors feeling ready to drive, and on return to feeling back to normal.  We also discussed the need for maintaining a healthy lifestyle long-term after donation    We discussed the national [employer] system and the possibility of participating, if not a match for the intended recipient.  I explained how the system worked.  At the conclusion of the visit, all questions had been answered and Ms. Pritchett's candidacy for donation will be reviewed at our Multidisciplinary Donor Selection Committee. She will stay in contact with the nurse coordinator with any concerns.      As an aside, we have recently developed a risk calculator based on our long-term donor follow-up.  Our goal was to provide information to donors, donor candidates and the medical community. The calculator, available at the web site below (it loads slowly) provides a risk estimate over 40 years postdonation of developing diabetes, hypertension, proteinuria, reduced eGFR and ESRD.   We hope you find it useful,  (And please let us know if you have difficulties using it or have suggestions for improvement.)  [email or web address]    40 min spent on the date of the encounter in chart review, patient visit,  documentation and/or discussion with other providers about the issues documented above.        Chase Robledo MD  Surgical Professor, Kidney Transplantation                                                                                                       ---------------------------------------------------------------------------------------------------    HPI: Kim Pritchett is a 67 year old year old female who presents for a kidney donor evaluation.            Past Medical History:   Diagnosis Date    Allergic rhinitis due to pollen     Basal cell carcinoma 2022    Glaucoma suspect, unspecified laterality     Iritis     HLA-B27 gene    Varicose veins of both lower extremities      Past Surgical History:   Procedure Laterality Date    TONSILLECTOMY  1964    WISDOM TOOTH EXTRACTION       Family History   Problem Relation Age of Onset    Esophageal Cancer Mother     Colon Cancer Mother     Skin Cancer Mother     Hypertension Father     Heart Disease Father     Hypertension Sister     Heart Disease Paternal Grandmother     Obesity Paternal Grandmother     No Known Problems Daughter     No Known Problems Son      Social History     Socioeconomic History    Marital status:      Spouse name: Not on file    Number of children: Not on file    Years of education: Not on file    Highest education level: Not on file   Occupational History    Not on file   Tobacco Use    Smoking status: Former     Years: 35.00     Types: Cigarettes     Quit date: 2017     Years since quittin.4    Smokeless tobacco: Never   Vaping Use    Vaping Use: Every day    Substances: Nicotine   Substance and Sexual Activity    Alcohol use: Yes     Comment: 1 drink daily    Drug use: Never    Sexual activity: Not on file   Other Topics Concern    Not on file   Social History Narrative    Not on file     Social Determinants of Health     Financial Resource Strain: Not on file   Food Insecurity: Not on file   Transportation Needs: Not on file   Physical Activity: Not on file   Stress: Not on file   Social Connections: Not on file   Intimate Partner Violence: Not on file   Housing Stability: Not on file       ROS:   CONSTITUTIONAL:  No fevers or chills  EYES: negative for icterus  ENT:   negative for hearing loss, tinnitus and sore throat  RESPIRATORY:  negative for cough, sputum, dyspnea  CARDIOVASCULAR:  negative for chest pain  GASTROINTESTINAL:  negative for nausea, vomiting, diarrhea or constipation  GENITOURINARY:  negative for incontinence, dysuria, bladder emptying problems  HEME:  No easy bruising  INTEGUMENT:  negative for rash and pruritus  NEURO:  Negative for headache, seizure disorder    Allergies:   No Known Allergies    Medications:  Prescription Medications as of 6/26/2023         Rx Number Disp Refills Start End Last Dispensed Date Next Fill Date Owning Pharmacy    melatonin 5 MG tablet            Sig: Take 5 mg by mouth nightly as needed for sleep    Class: Historical    Route: Oral            Exam:      Body mass index is 24.63 kg/m .  Constitutional - A&O in NAD.   Eyes - no redness or discharge.  Sclera anicteric  Respiratory - no cough, no labored breathing  Musculoskeletal - range of motion normal  Skin - no discoloration, no jaundice  Neurological - no tremors.  No facial droop or dysarthria  Psychiatric - normal mood and affect  The rest of a comprehensive physical examination is deferred due to PHE (public health emergency) video visit restrictions     Diagnostics:   Recent Results (from the past 336 hour(s))   Blood Group A Subtype    Collection Time: 06/15/23  7:36 AM   Result Value Ref Range    A1 Antigen Type Positive     SPECIMEN EXPIRATION DATE [id number]    EBV Capsid Antibody IgM    Collection Time: 06/15/23  7:36 AM   Result Value Ref Range    EBV Capsid Ivis IgM Instrument Value 10.3 <36.0 U/mL    EBV Capsid Antibody IgM No detectable antibody. No detectable antibody.   EBV Capsid Antibody IgG    Collection Time: 06/15/23  7:36 AM   Result Value Ref Range    EBV Capsid Ivis IgG Instrument Value >750.0 (H) <18.0 U/mL    EBV Capsid Antibody IgG Positive (A) No detectable antibody.   CMV Antibody IgG    Collection Time: 06/15/23  7:36 AM   Result Value Ref Range     CMV Ivis IgG Instrument Value <0.20 <0.60 U/mL    CMV Antibody IgG No detectable antibody. No detectable antibody.    West Nile Virus Antibody IgG IgM    Collection Time: 06/15/23  7:36 AM   Result Value Ref Range    West Nile IgG Serum 0.35 <=1.29 IV    West Nile IgM Serum 0.00 <=0.89 IV   Treponema Abs w Reflex to RPR and Titer    Collection Time: 06/15/23  7:36 AM   Result Value Ref Range    Treponema Antibody Total Nonreactive Nonreactive   HIV Antigen Antibody Combo Pretransplant    Collection Time: 06/15/23  7:36 AM   Result Value Ref Range    HIV Antigen Antibody Combo Pretransplant Nonreactive Nonreactive   Hepatitis C antibody    Collection Time: 06/15/23  7:36 AM   Result Value Ref Range    Hepatitis C Antibody Nonreactive Nonreactive   Hepatitis B surface antigen    Collection Time: 06/15/23  7:36 AM   Result Value Ref Range    Hepatitis B Surface Antigen Nonreactive Nonreactive   Hepatitis B Surface Antibody    Collection Time: 06/15/23  7:36 AM   Result Value Ref Range    Hepatitis B Surface Antibody Instrument Value 0.58 <8.00 m[IU]/mL    Hepatitis B Surface Antibody Nonreactive    Hepatitis B core antibody    Collection Time: 06/15/23  7:36 AM   Result Value Ref Range    Hepatitis B Core Antibody Total Nonreactive Nonreactive   Protein  random urine    Collection Time: 06/15/23  7:36 AM   Result Value Ref Range    Total Protein Urine mg/dL <6.0   mg/dL    Total Protein UR MG/MG CR      Creatinine Urine mg/dL 32.7 mg/dL   Albumin Random Urine Quantitative with Creat Ratio    Collection Time: 06/15/23  7:36 AM   Result Value Ref Range    Creatinine Urine mg/dL 32.9 mg/dL    Albumin Urine mg/L <12.0 mg/L    Albumin Urine mg/g Cr     Routine UA with microscopic    Collection Time: 06/15/23  7:36 AM   Result Value Ref Range    Color Urine Straw Colorless, Straw, Light Yellow, Yellow    Appearance Urine Clear Clear    Glucose Urine Negative Negative mg/dL    Bilirubin Urine Negative Negative    Ketones  Urine Negative Negative mg/dL    Specific Gravity Urine 1.005 1.003 - 1.035    Blood Urine Negative Negative    pH Urine 5.5 5.0 - 7.0    Protein Albumin Urine Negative Negative mg/dL    Urobilinogen Urine Normal Normal, 2.0 mg/dL    Nitrite Urine Negative Negative    Leukocyte Esterase Urine Moderate (A) Negative    Bacteria Urine Few (A) None Seen /HPF    RBC Urine 1 <=2 /HPF    WBC Urine 14 (H) <=5 /HPF    Squamous Epithelials Urine 1 <=1 /HPF   CBC with platelets    Collection Time: 06/15/23  7:36 AM   Result Value Ref Range    WBC Count 6.6 4.0 - 11.0 10e3/uL    RBC Count 4.57 3.80 - 5.20 10e6/uL    Hemoglobin 13.6 11.7 - 15.7 g/dL    Hematocrit 41.7 35.0 - 47.0 %    MCV 91 78 - 100 fL    MCH 29.8 26.5 - 33.0 pg    MCHC 32.6 31.5 - 36.5 g/dL    RDW 13.6 10.0 - 15.0 %    Platelet Count 379 150 - 450 10e3/uL   Partial thromboplastin time    Collection Time: 06/15/23  7:36 AM   Result Value Ref Range    aPTT 28 22 - 38 Seconds   INR    Collection Time: 06/15/23  7:36 AM   Result Value Ref Range    INR 0.95 0.85 - 1.15   Hemoglobin A1c    Collection Time: 06/15/23  7:36 AM   Result Value Ref Range    Hemoglobin A1C 5.6 <5.7 %   Phosphorus    Collection Time: 06/15/23  7:36 AM   Result Value Ref Range    Phosphorus 3.1 2.5 - 4.5 mg/dL   Uric acid    Collection Time: 06/15/23  7:36 AM   Result Value Ref Range    Uric Acid 4.8 2.4 - 5.7 mg/dL   Lipid Profile    Collection Time: 06/15/23  7:36 AM   Result Value Ref Range    Cholesterol 180 <200 mg/dL    Triglycerides 89 <150 mg/dL    Direct Measure HDL 86 >=50 mg/dL    LDL Cholesterol Calculated 76 <=100 mg/dL    Non HDL Cholesterol 94 <130 mg/dL   Comprehensive metabolic panel    Collection Time: 06/15/23  7:36 AM   Result Value Ref Range    Sodium 140 136 - 145 mmol/L    Potassium 4.0 3.4 - 5.3 mmol/L    Chloride 105 98 - 107 mmol/L    Carbon Dioxide (CO2) 26 22 - 29 mmol/L    Anion Gap 9 7 - 15 mmol/L    Urea Nitrogen 15.5 8.0 - 23.0 mg/dL    Creatinine 0.90 0.51 -  0.95 mg/dL    Calcium 9.6 8.8 - 10.2 mg/dL    Glucose 101 (H) 70 - 99 mg/dL    Alkaline Phosphatase 123 (H) 35 - 104 U/L    AST 16 0 - 45 U/L    ALT 12 0 - 50 U/L    Protein Total 6.9 6.4 - 8.3 g/dL    Albumin 4.2 3.5 - 5.2 g/dL    Bilirubin Total 0.3 <=1.2 mg/dL    GFR Estimate 70 >60 mL/min/1.73m2   Quantiferon TB Gold Plus Grey Tube    Collection Time: 06/15/23  7:36 AM    Specimen: Line, venous; Blood   Result Value Ref Range    Quantiferon Nil Tube 0.00 IU/mL   Quantiferon TB Gold Plus Green Tube    Collection Time: 06/15/23  7:36 AM    Specimen: Line, venous; Blood   Result Value Ref Range    Quantiferon TB1 Tube 0.02 IU/mL   Quantiferon TB Gold Plus Yellow Tube    Collection Time: 06/15/23  7:36 AM    Specimen: Line, venous; Blood   Result Value Ref Range    Quantiferon TB2 Tube 0.04    Quantiferon TB Gold Plus Purple Tube    Collection Time: 06/15/23  7:36 AM    Specimen: Line, venous; Blood   Result Value Ref Range    Quantiferon Mitogen 10.00 IU/mL   Adult Type and Screen    Collection Time: 06/15/23  7:36 AM   Result Value Ref Range    ABO/RH(D) A POS     Antibody Screen Negative Negative    SPECIMEN EXPIRATION DATE [id number]    Quantiferon TB Gold Plus    Collection Time: 06/15/23  7:36 AM    Specimen: Line, venous; Blood   Result Value Ref Range    Quantiferon-TB Gold Plus Negative Negative    TB1 Ag minus Nil Value 0.02 IU/mL    TB2 Ag minus Nil Value 0.04 IU/mL    Mitogen minus Nil Result 10.00 IU/mL    Nil Result 0.00 IU/mL   ABO and Rh 2nd type and screen required    Collection Time: 06/15/23  7:49 AM   Result Value Ref Range    ABO/RH(D) A POS     SPECIMEN EXPIRATION DATE [id number]    Iohexol 1st Order    Collection Time: 06/15/23  9:55 AM   Result Value Ref Range    Iohexol Time 1 10.28 mg/dL    Iohexol Time 2 5.80 mg/dL    Iohexol Body Surface Area 1.825 m2    Iohexol Raw Clearance 71 mL/min    Iohexol Std Clearance 67 /1.73 m2   EKG 12-lead complete w/read - Clinics    Collection Time:  06/15/23  1:43 PM   Result Value Ref Range    Systolic Blood Pressure  mmHg    Diastolic Blood Pressure  mmHg    Ventricular Rate 62 BPM    Atrial Rate 62 BPM    CO Interval 200 ms    QRS Duration 70 ms     ms    QTc 416 ms    P Axis 78 degrees    R AXIS 73 degrees    T Axis 77 degrees    Interpretation ECG       Sinus rhythm  Possible Left atrial enlargement  Septal infarct , age undetermined  Abnormal ECG  No previous ECGs available  Confirmed by MD MARTINEZ DAVID ([id number]) on 6/16/2023 12:14:06 PM     X-ray Chest 2 vws*    Collection Time: 06/15/23  2:02 PM   Result Value Ref Range    Radiologist flags Ill-defined opacity at the right apex. CT          Chase Robledo MD

## 2023-06-16 LAB
ATRIAL RATE - MUSE: 62 BPM
DIASTOLIC BLOOD PRESSURE - MUSE: NORMAL MMHG
EBV VCA IGM SER IA-ACNC: 10.3 U/ML
EBV VCA IGM SER IA-ACNC: NORMAL
GAMMA INTERFERON BACKGROUND BLD IA-ACNC: 0 IU/ML
INTERPRETATION ECG - MUSE: NORMAL
M TB IFN-G BLD-IMP: NEGATIVE
M TB IFN-G CD4+ BCKGRND COR BLD-ACNC: 10 IU/ML
MITOGEN IGNF BCKGRD COR BLD-ACNC: 0.02 IU/ML
MITOGEN IGNF BCKGRD COR BLD-ACNC: 0.04 IU/ML
P AXIS - MUSE: 78 DEGREES
PR INTERVAL - MUSE: 200 MS
QRS DURATION - MUSE: 70 MS
QT - MUSE: 410 MS
QTC - MUSE: 416 MS
QUANTIFERON MITOGEN: 10 IU/ML
QUANTIFERON NIL TUBE: 0 IU/ML
QUANTIFERON TB1 TUBE: 0.02 IU/ML
QUANTIFERON TB2 TUBE: 0.04
R AXIS - MUSE: 73 DEGREES
SYSTOLIC BLOOD PRESSURE - MUSE: NORMAL MMHG
T AXIS - MUSE: 77 DEGREES
VENTRICULAR RATE- MUSE: 62 BPM

## 2023-06-17 PROBLEM — C44.91 BASAL CELL CARCINOMA: Status: ACTIVE | Noted: 2022-01-01

## 2023-06-17 PROBLEM — I83.93 VARICOSE VEINS OF BOTH LOWER EXTREMITIES: Status: ACTIVE | Noted: 2023-06-17

## 2023-06-18 LAB
WNV IGG SER IA-ACNC: 0.35 IV
WNV IGM SER IA-ACNC: 0 IV

## 2023-06-20 LAB
BSA: 1.82 M2
IOHEXOL CL UR+SERPL-VRATE: 10.28 MG/DL
IOHEXOL CL UR+SERPL-VRATE: 5.8 MG/DL
IOHEXOL CL UR+SERPL-VRATE: 67 /1.73 M2
IOHEXOL CL UR+SERPL-VRATE: 71 ML/MIN

## 2023-06-21 ENCOUNTER — COMMITTEE REVIEW (OUTPATIENT)
Dept: TRANSPLANT | Facility: CLINIC | Age: 68
End: 2023-06-21
Payer: COMMERCIAL

## 2023-06-21 ENCOUNTER — TELEPHONE (OUTPATIENT)
Dept: TRANSPLANT | Facility: CLINIC | Age: 68
End: 2023-06-21
Payer: COMMERCIAL

## 2023-06-21 NOTE — TELEPHONE ENCOUNTER
Spoke to Kim regarding committee review results:    Committee Discussion Details:   1. Iohexol is too low to proceed with donation.  2. Will need her to follow up with PCP regarding ill-defined opacity right apex (complete Chest CT)

## 2023-06-21 NOTE — COMMITTEE REVIEW
Living Donor Committee Review Note Evaluation Date: 6/15/2023  Committee Review Date: 6/21/2023    Donor being evaluated for: Kidney    Transplant Phase: Evaluation  Transplant Status: Active    Transplant Coordinator: Esther Powell  Transplant Surgeon:       Committee Review Members:  Independent Living Donor Advocate Serina Lim, [facility]   Nephrology Hector Laurent MD, Gavino Khan MD   Nutrition Radhika Villafuerte, [facility]   Pharmacist Jasmina Garcia, [facility]    - Clinical Rachel Maddox   Transplant Melany Margie Bales, RN, Dee Dee Menendez, RN, Veto Dash, RN, Pia Ibarra LPN, ZHEN BROCK, RN, Devorah Miller, GAURAV, Esther Lo, RN   Transplant Surgery Chase Robledo MD       Transplant Eligibility: Acceptable Physical Health, Acceptable Mental Health    Committee Review Decision: Declined    Relative Contraindications: None    Absolute Contraindications: Measured GFR < 80cc/min.  Donor > Age 60, GFR < 75cc/min    Committee Chair Chase Robledo MD verbally attested to the committee's decision.    Committee Discussion Details:   1. Iohexol is too low to proceed with donation.  2. Will need her to follow up with PCP regarding ill-defined opacity right apex (complete Chest CT)

## 2023-06-26 LAB
A*: NORMAL
A*LOCUS SEROLOGIC EQUIVALENT: 2
A*LOCUS: NORMAL
A*SEROLOGIC EQUIVALENT: 3
ABTEST METHOD: NORMAL
B*: NORMAL
B*LOCUS SEROLOGIC EQUIVALENT: 65
B*LOCUS: NORMAL
B*SEROLOGIC EQUIVALENT: 27
BW-1: NORMAL
BW-2: NORMAL
C*: NORMAL
C*LOCUS SEROLOGIC EQUIVALENT: 2
C*LOCUS: NORMAL
C*SEROLOGIC EQUIVALENT: 8
DPA1*: NORMAL
DPB1*: NORMAL
DPB1*LOCUS NMDP: NORMAL
DPB1*LOCUS: NORMAL
DPB1*NMDP: NORMAL
DQA1*: NORMAL
DQA1*LOCUS: NORMAL
DQB1*: NORMAL
DQB1*LOCUS SEROLOGIC EQUIVALENT: 8
DQB1*LOCUS: NORMAL
DQB1*SEROLOGIC EQUIVALENT: 6
DRB1*: NORMAL
DRB1*LOCUS SEROLOGIC EQUIVALENT: 4
DRB1*LOCUS: NORMAL
DRB1*SEROLOGIC EQUIVALENT: 15
DRB4*LOCUS SEROLOGIC EQUIVALENT: 53
DRB4*LOCUS: NORMAL
DRB5*: NORMAL
DRB5*SEROLOGIC EQUIVALENT: 51
DRSSO TEST METHOD: NORMAL

## 2023-06-26 NOTE — PROGRESS NOTES
Transplant Surgery Consult Note    Medical record number: [id number]  YOB: 1955,   Consult requested by the patient for evaluation of kidney donation candidacy.    Assessment and Recommendations: Ms. Pritchett appears to be a good candidate for kidney donation at this point in the evaluation. The following issues will need to be addressed prior to formal review:    CT abdomen and pelvis with contrast to be ordered for assessment vascular anatomy: Yes  Dietician consult ordered: Yes  Social work consult ordered: Yes  CXR and EKG ordered:  Yes  Transplant donor labs ordered to include HLA, ABOx2, Cr, Iohexol GFR or Cr clearance, virology etc.       Patient would like to donate to a friend's daughter. The risks of the surgical procedure including but not limited to the rare risk of mortality discussed in detail. Patient verbalized good understanding and had several pertinent questions which were answered.      The majority of our visit today was spent in counseling regarding the medical and surgical risks of kidney donation; the typical liz-and post-operative experience and recovery/return to work pattern; restrictions related to the surgery (driving; lifting; exercise).      We also talked about post-op visits and longer term health care maintenance, as well as the implications of having one remaining kidney. This discussion included, but was not limited to rates of complications such as bleeding, infection, need for transfusion, reoperation, other organ injury, future bowel obstruction, incisional hernia, port site pain, venous thrombosis, pulmonary embolism, renal failure, and death (3 per 10,000).     We discussed long-term risks in detail.  I discussed the articles suggesting a small increase in ESKD in donors and their limitations    We discussed recovery, including length of hospital stay; no new meds other than pain meds on discharge; limitations after surgery (no driving for a couple of weeks; no  lifting over 10 lbs or exercise stretching abdominal muscles for 6 weeks; return to work; and fatigue for the first few weeks postdonation.  I informed her of our donor survey results on donors feeling ready to drive, and on return to feeling back to normal.  We also discussed the need for maintaining a healthy lifestyle long-term after donation    We discussed the national paired system and the possibility of participating, if not a match for the intended recipient.  I explained how the system worked.  At the conclusion of the visit, all questions had been answered and Ms. Pritchett's candidacy for donation will be reviewed at our Multidisciplinary Donor Selection Committee. She will stay in contact with the nurse coordinator with any concerns.      As an aside, we have recently developed a risk calculator based on our long-term donor follow-up.  Our goal was to provide information to donors, donor candidates and the medical community. The calculator, available at the web site below (it loads slowly) provides a risk estimate over 40 years postdonation of developing diabetes, hypertension, proteinuria, reduced eGFR and ESRD.   We hope you find it useful,  (And please let us know if you have difficulties using it or have suggestions for improvement.)  [email or web address]    40 min spent on the date of the encounter in chart review, patient visit,  documentation and/or discussion with other providers about the issues documented above.        Chase Robledo MD  Surgical Professor, Kidney Transplantation                                                                                                      ---------------------------------------------------------------------------------------------------    HPI: Kim Pritchett is a 67 year old year old female who presents for a kidney donor evaluation.            Past Medical History:   Diagnosis Date     Allergic rhinitis due to pollen      Basal  cell carcinoma 2022     Glaucoma suspect, unspecified laterality      Iritis     HLA-B27 gene     Varicose veins of both lower extremities      Past Surgical History:   Procedure Laterality Date     TONSILLECTOMY  1964     WISDOM TOOTH EXTRACTION       Family History   Problem Relation Age of Onset     Esophageal Cancer Mother      Colon Cancer Mother      Skin Cancer Mother      Hypertension Father      Heart Disease Father      Hypertension Sister      Heart Disease Paternal Grandmother      Obesity Paternal Grandmother      No Known Problems Daughter      No Known Problems Son      Social History     Socioeconomic History     Marital status:      Spouse name: Not on file     Number of children: Not on file     Years of education: Not on file     Highest education level: Not on file   Occupational History     Not on file   Tobacco Use     Smoking status: Former     Years: 35.00     Types: Cigarettes     Quit date:      Years since quittin.4     Smokeless tobacco: Never   Vaping Use     Vaping Use: Every day     Substances: Nicotine   Substance and Sexual Activity     Alcohol use: Yes     Comment: 1 drink daily     Drug use: Never     Sexual activity: Not on file   Other Topics Concern     Not on file   Social History Narrative     Not on file     Social Determinants of Health     Financial Resource Strain: Not on file   Food Insecurity: Not on file   Transportation Needs: Not on file   Physical Activity: Not on file   Stress: Not on file   Social Connections: Not on file   Intimate Partner Violence: Not on file   Housing Stability: Not on file       ROS:   CONSTITUTIONAL:  No fevers or chills  EYES: negative for icterus  ENT:  negative for hearing loss, tinnitus and sore throat  RESPIRATORY:  negative for cough, sputum, dyspnea  CARDIOVASCULAR:  negative for chest pain  GASTROINTESTINAL:  negative for nausea, vomiting, diarrhea or constipation  GENITOURINARY:  negative for incontinence,  dysuria, bladder emptying problems  HEME:  No easy bruising  INTEGUMENT:  negative for rash and pruritus  NEURO:  Negative for headache, seizure disorder    Allergies:   No Known Allergies    Medications:  Prescription Medications as of 6/26/2023       Rx Number Disp Refills Start End Last Dispensed Date Next Fill Date Owning Pharmacy    melatonin 5 MG tablet            Sig: Take 5 mg by mouth nightly as needed for sleep    Class: Historical    Route: Oral          Exam:      Body mass index is 24.63 kg/m .  Constitutional - A&O in NAD.   Eyes - no redness or discharge.  Sclera anicteric  Respiratory - no cough, no labored breathing  Musculoskeletal - range of motion normal  Skin - no discoloration, no jaundice  Neurological - no tremors.  No facial droop or dysarthria  Psychiatric - normal mood and affect  The rest of a comprehensive physical examination is deferred due to PHE (public health emergency) video visit restrictions     Diagnostics:   Recent Results (from the past 336 hour(s))   Blood Group A Subtype    Collection Time: 06/15/23  7:36 AM   Result Value Ref Range    A1 Antigen Type Positive     SPECIMEN EXPIRATION DATE [id number]    EBV Capsid Antibody IgM    Collection Time: 06/15/23  7:36 AM   Result Value Ref Range    EBV Capsid Ivis IgM Instrument Value 10.3 <36.0 U/mL    EBV Capsid Antibody IgM No detectable antibody. No detectable antibody.   EBV Capsid Antibody IgG    Collection Time: 06/15/23  7:36 AM   Result Value Ref Range    EBV Capsid Ivis IgG Instrument Value >750.0 (H) <18.0 U/mL    EBV Capsid Antibody IgG Positive (A) No detectable antibody.   CMV Antibody IgG    Collection Time: 06/15/23  7:36 AM   Result Value Ref Range    CMV Ivis IgG Instrument Value <0.20 <0.60 U/mL    CMV Antibody IgG No detectable antibody. No detectable antibody.    West Nile Virus Antibody IgG IgM    Collection Time: 06/15/23  7:36 AM   Result Value Ref Range    West Nile IgG Serum 0.35 <=1.29 IV    West Nile IgM  Serum 0.00 <=0.89 IV   Treponema Abs w Reflex to RPR and Titer    Collection Time: 06/15/23  7:36 AM   Result Value Ref Range    Treponema Antibody Total Nonreactive Nonreactive   HIV Antigen Antibody Combo Pretransplant    Collection Time: 06/15/23  7:36 AM   Result Value Ref Range    HIV Antigen Antibody Combo Pretransplant Nonreactive Nonreactive   Hepatitis C antibody    Collection Time: 06/15/23  7:36 AM   Result Value Ref Range    Hepatitis C Antibody Nonreactive Nonreactive   Hepatitis B surface antigen    Collection Time: 06/15/23  7:36 AM   Result Value Ref Range    Hepatitis B Surface Antigen Nonreactive Nonreactive   Hepatitis B Surface Antibody    Collection Time: 06/15/23  7:36 AM   Result Value Ref Range    Hepatitis B Surface Antibody Instrument Value 0.58 <8.00 m[IU]/mL    Hepatitis B Surface Antibody Nonreactive    Hepatitis B core antibody    Collection Time: 06/15/23  7:36 AM   Result Value Ref Range    Hepatitis B Core Antibody Total Nonreactive Nonreactive   Protein  random urine    Collection Time: 06/15/23  7:36 AM   Result Value Ref Range    Total Protein Urine mg/dL <6.0   mg/dL    Total Protein UR MG/MG CR      Creatinine Urine mg/dL 32.7 mg/dL   Albumin Random Urine Quantitative with Creat Ratio    Collection Time: 06/15/23  7:36 AM   Result Value Ref Range    Creatinine Urine mg/dL 32.9 mg/dL    Albumin Urine mg/L <12.0 mg/L    Albumin Urine mg/g Cr     Routine UA with microscopic    Collection Time: 06/15/23  7:36 AM   Result Value Ref Range    Color Urine Straw Colorless, Straw, Light Yellow, Yellow    Appearance Urine Clear Clear    Glucose Urine Negative Negative mg/dL    Bilirubin Urine Negative Negative    Ketones Urine Negative Negative mg/dL    Specific Gravity Urine 1.005 1.003 - 1.035    Blood Urine Negative Negative    pH Urine 5.5 5.0 - 7.0    Protein Albumin Urine Negative Negative mg/dL    Urobilinogen Urine Normal Normal, 2.0 mg/dL    Nitrite Urine Negative Negative     Leukocyte Esterase Urine Moderate (A) Negative    Bacteria Urine Few (A) None Seen /HPF    RBC Urine 1 <=2 /HPF    WBC Urine 14 (H) <=5 /HPF    Squamous Epithelials Urine 1 <=1 /HPF   CBC with platelets    Collection Time: 06/15/23  7:36 AM   Result Value Ref Range    WBC Count 6.6 4.0 - 11.0 10e3/uL    RBC Count 4.57 3.80 - 5.20 10e6/uL    Hemoglobin 13.6 11.7 - 15.7 g/dL    Hematocrit 41.7 35.0 - 47.0 %    MCV 91 78 - 100 fL    MCH 29.8 26.5 - 33.0 pg    MCHC 32.6 31.5 - 36.5 g/dL    RDW 13.6 10.0 - 15.0 %    Platelet Count 379 150 - 450 10e3/uL   Partial thromboplastin time    Collection Time: 06/15/23  7:36 AM   Result Value Ref Range    aPTT 28 22 - 38 Seconds   INR    Collection Time: 06/15/23  7:36 AM   Result Value Ref Range    INR 0.95 0.85 - 1.15   Hemoglobin A1c    Collection Time: 06/15/23  7:36 AM   Result Value Ref Range    Hemoglobin A1C 5.6 <5.7 %   Phosphorus    Collection Time: 06/15/23  7:36 AM   Result Value Ref Range    Phosphorus 3.1 2.5 - 4.5 mg/dL   Uric acid    Collection Time: 06/15/23  7:36 AM   Result Value Ref Range    Uric Acid 4.8 2.4 - 5.7 mg/dL   Lipid Profile    Collection Time: 06/15/23  7:36 AM   Result Value Ref Range    Cholesterol 180 <200 mg/dL    Triglycerides 89 <150 mg/dL    Direct Measure HDL 86 >=50 mg/dL    LDL Cholesterol Calculated 76 <=100 mg/dL    Non HDL Cholesterol 94 <130 mg/dL   Comprehensive metabolic panel    Collection Time: 06/15/23  7:36 AM   Result Value Ref Range    Sodium 140 136 - 145 mmol/L    Potassium 4.0 3.4 - 5.3 mmol/L    Chloride 105 98 - 107 mmol/L    Carbon Dioxide (CO2) 26 22 - 29 mmol/L    Anion Gap 9 7 - 15 mmol/L    Urea Nitrogen 15.5 8.0 - 23.0 mg/dL    Creatinine 0.90 0.51 - 0.95 mg/dL    Calcium 9.6 8.8 - 10.2 mg/dL    Glucose 101 (H) 70 - 99 mg/dL    Alkaline Phosphatase 123 (H) 35 - 104 U/L    AST 16 0 - 45 U/L    ALT 12 0 - 50 U/L    Protein Total 6.9 6.4 - 8.3 g/dL    Albumin 4.2 3.5 - 5.2 g/dL    Bilirubin Total 0.3 <=1.2 mg/dL     GFR Estimate 70 >60 mL/min/1.73m2   Quantiferon TB Gold Plus Grey Tube    Collection Time: 06/15/23  7:36 AM    Specimen: Line, venous; Blood   Result Value Ref Range    Quantiferon Nil Tube 0.00 IU/mL   Quantiferon TB Gold Plus Green Tube    Collection Time: 06/15/23  7:36 AM    Specimen: Line, venous; Blood   Result Value Ref Range    Quantiferon TB1 Tube 0.02 IU/mL   Quantiferon TB Gold Plus Yellow Tube    Collection Time: 06/15/23  7:36 AM    Specimen: Line, venous; Blood   Result Value Ref Range    Quantiferon TB2 Tube 0.04    Quantiferon TB Gold Plus Purple Tube    Collection Time: 06/15/23  7:36 AM    Specimen: Line, venous; Blood   Result Value Ref Range    Quantiferon Mitogen 10.00 IU/mL   Adult Type and Screen    Collection Time: 06/15/23  7:36 AM   Result Value Ref Range    ABO/RH(D) A POS     Antibody Screen Negative Negative    SPECIMEN EXPIRATION DATE [id number]    Quantiferon TB Gold Plus    Collection Time: 06/15/23  7:36 AM    Specimen: Line, venous; Blood   Result Value Ref Range    Quantiferon-TB Gold Plus Negative Negative    TB1 Ag minus Nil Value 0.02 IU/mL    TB2 Ag minus Nil Value 0.04 IU/mL    Mitogen minus Nil Result 10.00 IU/mL    Nil Result 0.00 IU/mL   ABO and Rh 2nd type and screen required    Collection Time: 06/15/23  7:49 AM   Result Value Ref Range    ABO/RH(D) A POS     SPECIMEN EXPIRATION DATE [id number]    Iohexol 1st Order    Collection Time: 06/15/23  9:55 AM   Result Value Ref Range    Iohexol Time 1 10.28 mg/dL    Iohexol Time 2 5.80 mg/dL    Iohexol Body Surface Area 1.825 m2    Iohexol Raw Clearance 71 mL/min    Iohexol Std Clearance 67 /1.73 m2   EKG 12-lead complete w/read - Clinics    Collection Time: 06/15/23  1:43 PM   Result Value Ref Range    Systolic Blood Pressure  mmHg    Diastolic Blood Pressure  mmHg    Ventricular Rate 62 BPM    Atrial Rate 62 BPM    DE Interval 200 ms    QRS Duration 70 ms     ms    QTc 416 ms    P Axis 78 degrees    R AXIS 73  degrees    T Axis 77 degrees    Interpretation ECG       Sinus rhythm  Possible Left atrial enlargement  Septal infarct , age undetermined  Abnormal ECG  No previous ECGs available  Confirmed by MD MICHELLE, AUBREY ([id number]) on 6/16/2023 12:14:06 PM     X-ray Chest 2 vws*    Collection Time: 06/15/23  2:02 PM   Result Value Ref Range    Radiologist flags Ill-defined opacity at the right apex. CT

## 2024-06-01 ENCOUNTER — HEALTH MAINTENANCE LETTER (OUTPATIENT)
Age: 69
End: 2024-06-01

## 2025-05-24 ENCOUNTER — HEALTH MAINTENANCE LETTER (OUTPATIENT)
Age: 70
End: 2025-05-24

## 2025-06-14 ENCOUNTER — HEALTH MAINTENANCE LETTER (OUTPATIENT)
Age: 70
End: 2025-06-14